# Patient Record
Sex: FEMALE | Race: WHITE | NOT HISPANIC OR LATINO | URBAN - METROPOLITAN AREA
[De-identification: names, ages, dates, MRNs, and addresses within clinical notes are randomized per-mention and may not be internally consistent; named-entity substitution may affect disease eponyms.]

---

## 2022-01-04 ENCOUNTER — INPATIENT (INPATIENT)
Facility: HOSPITAL | Age: 87
LOS: 1 days | Discharge: HOME | End: 2022-01-06
Attending: STUDENT IN AN ORGANIZED HEALTH CARE EDUCATION/TRAINING PROGRAM | Admitting: STUDENT IN AN ORGANIZED HEALTH CARE EDUCATION/TRAINING PROGRAM
Payer: MEDICARE

## 2022-01-04 VITALS
SYSTOLIC BLOOD PRESSURE: 116 MMHG | TEMPERATURE: 97 F | HEIGHT: 63 IN | HEART RATE: 80 BPM | OXYGEN SATURATION: 98 % | RESPIRATION RATE: 20 BRPM | DIASTOLIC BLOOD PRESSURE: 62 MMHG | WEIGHT: 130.07 LBS

## 2022-01-04 DIAGNOSIS — Z78.9 OTHER SPECIFIED HEALTH STATUS: Chronic | ICD-10-CM

## 2022-01-04 LAB
ALBUMIN SERPL ELPH-MCNC: 3.7 G/DL — SIGNIFICANT CHANGE UP (ref 3.5–5.2)
ALP SERPL-CCNC: 59 U/L — SIGNIFICANT CHANGE UP (ref 30–115)
ALT FLD-CCNC: 15 U/L — SIGNIFICANT CHANGE UP (ref 0–41)
ANION GAP SERPL CALC-SCNC: 13 MMOL/L — SIGNIFICANT CHANGE UP (ref 7–14)
APTT BLD: 29.4 SEC — SIGNIFICANT CHANGE UP (ref 27–39.2)
AST SERPL-CCNC: 36 U/L — SIGNIFICANT CHANGE UP (ref 0–41)
BASOPHILS # BLD AUTO: 0.04 K/UL — SIGNIFICANT CHANGE UP (ref 0–0.2)
BASOPHILS NFR BLD AUTO: 0.7 % — SIGNIFICANT CHANGE UP (ref 0–1)
BILIRUB SERPL-MCNC: 0.4 MG/DL — SIGNIFICANT CHANGE UP (ref 0.2–1.2)
BUN SERPL-MCNC: 21 MG/DL — HIGH (ref 10–20)
CALCIUM SERPL-MCNC: 9.4 MG/DL — SIGNIFICANT CHANGE UP (ref 8.5–10.1)
CHLORIDE SERPL-SCNC: 102 MMOL/L — SIGNIFICANT CHANGE UP (ref 98–110)
CO2 SERPL-SCNC: 21 MMOL/L — SIGNIFICANT CHANGE UP (ref 17–32)
CREAT SERPL-MCNC: 0.8 MG/DL — SIGNIFICANT CHANGE UP (ref 0.7–1.5)
EOSINOPHIL # BLD AUTO: 0.18 K/UL — SIGNIFICANT CHANGE UP (ref 0–0.7)
EOSINOPHIL NFR BLD AUTO: 3.3 % — SIGNIFICANT CHANGE UP (ref 0–8)
GLUCOSE SERPL-MCNC: 122 MG/DL — HIGH (ref 70–99)
HCT VFR BLD CALC: 42.1 % — SIGNIFICANT CHANGE UP (ref 37–47)
HGB BLD-MCNC: 13.8 G/DL — SIGNIFICANT CHANGE UP (ref 12–16)
IMM GRANULOCYTES NFR BLD AUTO: 0.2 % — SIGNIFICANT CHANGE UP (ref 0.1–0.3)
INR BLD: 1.15 RATIO — SIGNIFICANT CHANGE UP (ref 0.65–1.3)
LYMPHOCYTES # BLD AUTO: 2.58 K/UL — SIGNIFICANT CHANGE UP (ref 1.2–3.4)
LYMPHOCYTES # BLD AUTO: 47.1 % — SIGNIFICANT CHANGE UP (ref 20.5–51.1)
MAGNESIUM SERPL-MCNC: 2.2 MG/DL — SIGNIFICANT CHANGE UP (ref 1.8–2.4)
MCHC RBC-ENTMCNC: 29.2 PG — SIGNIFICANT CHANGE UP (ref 27–31)
MCHC RBC-ENTMCNC: 32.8 G/DL — SIGNIFICANT CHANGE UP (ref 32–37)
MCV RBC AUTO: 89.2 FL — SIGNIFICANT CHANGE UP (ref 81–99)
MONOCYTES # BLD AUTO: 0.41 K/UL — SIGNIFICANT CHANGE UP (ref 0.1–0.6)
MONOCYTES NFR BLD AUTO: 7.5 % — SIGNIFICANT CHANGE UP (ref 1.7–9.3)
NEUTROPHILS # BLD AUTO: 2.26 K/UL — SIGNIFICANT CHANGE UP (ref 1.4–6.5)
NEUTROPHILS NFR BLD AUTO: 41.2 % — LOW (ref 42.2–75.2)
NRBC # BLD: 0 /100 WBCS — SIGNIFICANT CHANGE UP (ref 0–0)
PLATELET # BLD AUTO: 213 K/UL — SIGNIFICANT CHANGE UP (ref 130–400)
POTASSIUM SERPL-MCNC: 5.7 MMOL/L — HIGH (ref 3.5–5)
POTASSIUM SERPL-SCNC: 5.7 MMOL/L — HIGH (ref 3.5–5)
PROT SERPL-MCNC: 6.4 G/DL — SIGNIFICANT CHANGE UP (ref 6–8)
PROTHROM AB SERPL-ACNC: 13.2 SEC — HIGH (ref 9.95–12.87)
RBC # BLD: 4.72 M/UL — SIGNIFICANT CHANGE UP (ref 4.2–5.4)
RBC # FLD: 13.1 % — SIGNIFICANT CHANGE UP (ref 11.5–14.5)
SARS-COV-2 RNA SPEC QL NAA+PROBE: SIGNIFICANT CHANGE UP
SODIUM SERPL-SCNC: 136 MMOL/L — SIGNIFICANT CHANGE UP (ref 135–146)
TROPONIN T SERPL-MCNC: <0.01 NG/ML — SIGNIFICANT CHANGE UP
WBC # BLD: 5.48 K/UL — SIGNIFICANT CHANGE UP (ref 4.8–10.8)
WBC # FLD AUTO: 5.48 K/UL — SIGNIFICANT CHANGE UP (ref 4.8–10.8)

## 2022-01-04 PROCEDURE — 99285 EMERGENCY DEPT VISIT HI MDM: CPT

## 2022-01-04 PROCEDURE — 99222 1ST HOSP IP/OBS MODERATE 55: CPT

## 2022-01-04 PROCEDURE — 71045 X-RAY EXAM CHEST 1 VIEW: CPT | Mod: 26

## 2022-01-04 PROCEDURE — 93880 EXTRACRANIAL BILAT STUDY: CPT | Mod: 26

## 2022-01-04 PROCEDURE — 93010 ELECTROCARDIOGRAM REPORT: CPT | Mod: 76

## 2022-01-04 RX ORDER — METOPROLOL TARTRATE 50 MG
50 TABLET ORAL DAILY
Refills: 0 | Status: DISCONTINUED | OUTPATIENT
Start: 2022-01-04 | End: 2022-01-06

## 2022-01-04 RX ORDER — SODIUM CHLORIDE 9 MG/ML
1000 INJECTION INTRAMUSCULAR; INTRAVENOUS; SUBCUTANEOUS
Refills: 0 | Status: DISCONTINUED | OUTPATIENT
Start: 2022-01-04 | End: 2022-01-06

## 2022-01-04 RX ORDER — ACETAMINOPHEN 500 MG
650 TABLET ORAL EVERY 6 HOURS
Refills: 0 | Status: DISCONTINUED | OUTPATIENT
Start: 2022-01-04 | End: 2022-01-06

## 2022-01-04 RX ORDER — LANOLIN ALCOHOL/MO/W.PET/CERES
5 CREAM (GRAM) TOPICAL AT BEDTIME
Refills: 0 | Status: DISCONTINUED | OUTPATIENT
Start: 2022-01-04 | End: 2022-01-06

## 2022-01-04 RX ORDER — APIXABAN 2.5 MG/1
5 TABLET, FILM COATED ORAL EVERY 12 HOURS
Refills: 0 | Status: DISCONTINUED | OUTPATIENT
Start: 2022-01-04 | End: 2022-01-04

## 2022-01-04 RX ORDER — APIXABAN 2.5 MG/1
2.5 TABLET, FILM COATED ORAL EVERY 12 HOURS
Refills: 0 | Status: DISCONTINUED | OUTPATIENT
Start: 2022-01-04 | End: 2022-01-06

## 2022-01-04 RX ORDER — ONDANSETRON 8 MG/1
4 TABLET, FILM COATED ORAL EVERY 8 HOURS
Refills: 0 | Status: DISCONTINUED | OUTPATIENT
Start: 2022-01-04 | End: 2022-01-06

## 2022-01-04 RX ORDER — ATORVASTATIN CALCIUM 80 MG/1
10 TABLET, FILM COATED ORAL AT BEDTIME
Refills: 0 | Status: DISCONTINUED | OUTPATIENT
Start: 2022-01-04 | End: 2022-01-06

## 2022-01-04 RX ADMIN — SODIUM CHLORIDE 75 MILLILITER(S): 9 INJECTION INTRAMUSCULAR; INTRAVENOUS; SUBCUTANEOUS at 09:43

## 2022-01-04 RX ADMIN — APIXABAN 2.5 MILLIGRAM(S): 2.5 TABLET, FILM COATED ORAL at 17:50

## 2022-01-04 RX ADMIN — ATORVASTATIN CALCIUM 10 MILLIGRAM(S): 80 TABLET, FILM COATED ORAL at 22:58
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
## 2022-01-04 NOTE — H&P ADULT - NSHPLABSRESULTS_GEN_ALL_CORE
.                        13.8   5.48  )-----------( 213      ( 04 Jan 2022 05:20 )             42.1       01-04    136  |  102  |  21<H>  ----------------------------<  122<H>  5.7<H>   |  21  |  0.8    Ca    9.4      04 Jan 2022 05:20  Mg     2.2     01-04    TPro  6.4  /  Alb  3.7  /  TBili  0.4  /  DBili  x   /  AST  36  /  ALT  15  /  AlkPhos  59  01-04            PT/INR - ( 04 Jan 2022 05:20 )   PT: 13.20 sec;   INR: 1.15 ratio      PTT - ( 04 Jan 2022 05:20 )  PTT:29.4 sec        Lactate Trend      CARDIAC MARKERS ( 04 Jan 2022 05:20 )  x     / <0.01 ng/mL / x     / x     / x              < from: Xray Chest 1 View AP/PA (01.04.22 @ 05:49) >    ACC: [id number] EXAM:  XR CHEST 1 VIEW                          PROCEDURE DATE:  01/04/2022          INTERPRETATION:  Clinical History / Reason for exam: Syncope.    Comparison : Chest radiograph June 24th, 2009.    Technique/Positioning: Frontal portable.    Findings:    Support devices: Telemetry leads overlie the thorax    Cardiac/mediastinum/hilum: The aorta is atherosclerotic.    Lung parenchyma/Pleura: Within normal limits.    Skeleton/soft tissues: Unremarkable.      Impression:    No radiographic evidence of acute cardiopulmonary disease.

## 2022-01-04 NOTE — H&P ADULT - ATTENDING COMMENTS
85 yo F with a pmhx of afib on eliquis underwent a witnessed syncopal episode with loc and did not hit her head.      - telemetry  - troponin neg x 1  - repeat troponins with ECG q 6hrs x 2  - orthostatics  - carotid duplex  - cardio consult  - TTE  - consider MRI/MRV

## 2022-01-04 NOTE — ED ADULT TRIAGE NOTE - CHIEF COMPLAINT QUOTE
BIBA. As per EMS patient was walking to the bathroom and passed out. Patient's daughter found patient on the floor. C/O weakness.

## 2022-01-04 NOTE — PATIENT PROFILE ADULT - FALL HARM RISK - HARM RISK INTERVENTIONS

## 2022-01-04 NOTE — H&P ADULT - NSICDXPASTMEDICALHX_GEN_ALL_CORE_FT
PAST MEDICAL HISTORY:  Chronic atrial fibrillation     HTN (hypertension)     Hyperlipemia     Overactive bladder

## 2022-01-04 NOTE — PATIENT PROFILE ADULT - LEGAL HELP
P/w: generalized weakness. Sepsis present on admission- no obvious source of infection at present;  f/u COVID-19 PCR and blood culture results2. BROOKS on CKD stage III- likely prerenal. Hypernatremia- likely due to dehydration. DM2: insulin protocol. Cognitive impairment likely dementia. DNR/DNI. Palliative consult.
no

## 2022-01-04 NOTE — H&P ADULT - SKIN
89y Female PMHx dementia, HTN, CVA (on aspirin, Plavix), HLD, CAD, [facility] pain and inability to ambulate.  S/p Surgical repair of hip fracture (4/24) c/b CVA post-op, failed S&S. Palliative Care consulted for complex decision making in the setting of dementia and new found CVA. No lesions; no rash
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
## 2022-01-05 LAB
CK MB CFR SERPL CALC: <1 NG/ML — SIGNIFICANT CHANGE UP (ref 0.6–6.3)
TROPONIN T SERPL-MCNC: <0.01 NG/ML — SIGNIFICANT CHANGE UP

## 2022-01-05 PROCEDURE — 99221 1ST HOSP IP/OBS SF/LOW 40: CPT

## 2022-01-05 PROCEDURE — 93306 TTE W/DOPPLER COMPLETE: CPT | Mod: 26

## 2022-01-05 PROCEDURE — 99233 SBSQ HOSP IP/OBS HIGH 50: CPT

## 2022-01-05 RX ORDER — OXYBUTYNIN CHLORIDE 5 MG
5 TABLET ORAL
Refills: 0 | Status: DISCONTINUED | OUTPATIENT
Start: 2022-01-05 | End: 2022-01-06

## 2022-01-05 RX ADMIN — Medication 5 MILLIGRAM(S): at 17:54

## 2022-01-05 RX ADMIN — APIXABAN 2.5 MILLIGRAM(S): 2.5 TABLET, FILM COATED ORAL at 17:55

## 2022-01-05 RX ADMIN — ATORVASTATIN CALCIUM 10 MILLIGRAM(S): 80 TABLET, FILM COATED ORAL at 21:54

## 2022-01-05 RX ADMIN — APIXABAN 2.5 MILLIGRAM(S): 2.5 TABLET, FILM COATED ORAL at 05:50

## 2022-01-05 NOTE — CONSULT NOTE ADULT - ASSESSMENT
86 year old woman with history of hld, paroxysmal afib on Eliquis 2.5 mg BID presented with episode of LOC. Exam is normal.  The presence of prodromal symptoms of dizziness is more suggestive of syncopal event rather than seizure. The episode of transient vision loss in both eyes and brain fog could also be indicative of paroxysmal event and presyncopal event.   Carotid Doppler showed only 20-30 percent stenosis.     - Recommend to increase Eliquis dose to 5 mg BID since patient's weight> 60 and GFR is normal   - Routine EEG   - Could obtain Brain MRI w/o contrast to r/o acute infarct  - Cardiology work up for syncope and paroxysmal AFIB
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
## 2022-01-06 ENCOUNTER — TRANSCRIPTION ENCOUNTER (OUTPATIENT)
Age: 87
End: 2022-01-06

## 2022-01-06 VITALS — HEART RATE: 64 BPM | DIASTOLIC BLOOD PRESSURE: 72 MMHG | SYSTOLIC BLOOD PRESSURE: 156 MMHG | TEMPERATURE: 98 F

## 2022-01-06 PROCEDURE — 99231 SBSQ HOSP IP/OBS SF/LOW 25: CPT

## 2022-01-06 PROCEDURE — 99233 SBSQ HOSP IP/OBS HIGH 50: CPT

## 2022-01-06 PROCEDURE — 70551 MRI BRAIN STEM W/O DYE: CPT | Mod: 26

## 2022-01-06 RX ORDER — APIXABAN 2.5 MG/1
1 TABLET, FILM COATED ORAL
Qty: 0 | Refills: 0 | DISCHARGE

## 2022-01-06 RX ORDER — APIXABAN 2.5 MG/1
1 TABLET, FILM COATED ORAL
Qty: 60 | Refills: 0
Start: 2022-01-06

## 2022-01-06 RX ADMIN — Medication 50 MILLIGRAM(S): at 06:06

## 2022-01-06 RX ADMIN — APIXABAN 2.5 MILLIGRAM(S): 2.5 TABLET, FILM COATED ORAL at 06:06

## 2022-01-06 RX ADMIN — APIXABAN 2.5 MILLIGRAM(S): 2.5 TABLET, FILM COATED ORAL at 18:23

## 2022-01-06 RX ADMIN — SODIUM CHLORIDE 75 MILLILITER(S): 9 INJECTION INTRAMUSCULAR; INTRAVENOUS; SUBCUTANEOUS at 01:33

## 2022-01-06 RX ADMIN — Medication 5 MILLIGRAM(S): at 18:33

## 2022-01-06 RX ADMIN — Medication 5 MILLIGRAM(S): at 06:06

## 2022-01-06 NOTE — DISCHARGE NOTE PROVIDER - NSDCMRMEDTOKEN_GEN_ALL_CORE_FT
Eliquis 5 mg oral tablet: 1 tab(s) orally 2 times a day  fenofibrate 54 mg oral tablet: 1 tab(s) orally once a day  lovastatin 20 mg oral tablet: 1 tab(s) orally once a day  metoprolol succinate 50 mg oral tablet, extended release: 1 tab(s) orally once a day  Myrbetriq 50 mg oral tablet, extended release: 1 tab(s) orally once a day (at bedtime)   apixaban 2.5 mg oral tablet: 1 tab(s) orally every 12 hours  fenofibrate 54 mg oral tablet: 1 tab(s) orally once a day  lovastatin 20 mg oral tablet: 1 tab(s) orally once a day  metoprolol succinate 50 mg oral tablet, extended release: 1 tab(s) orally once a day  Myrbetriq 50 mg oral tablet, extended release: 1 tab(s) orally once a day (at bedtime)

## 2022-01-06 NOTE — DISCHARGE NOTE NURSING/CASE MANAGEMENT/SOCIAL WORK - PATIENT PORTAL LINK FT
You can access the FollowMyHealth Patient Portal offered by [facility] by registering at the following website: [email or web address]. By joining [employer]’s FollowMyHealth portal, you will also be able to view your health information using other applications (apps) compatible with our system.

## 2022-01-06 NOTE — DISCHARGE NOTE PROVIDER - CARE PROVIDER_API CALL
Song Staton  [address] [place] [address]  Phone: [phone]  Fax: [phone]  Follow Up Time: 1 week
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
## 2022-01-06 NOTE — DISCHARGE NOTE NURSING/CASE MANAGEMENT/SOCIAL WORK - NSDCPEFALRISK_GEN_ALL_CORE
For information on Fall & Injury Prevention, visit: [email or web address] OR  [email or web address] OR  https://www.cdc.gov/steadi/patient.html

## 2022-01-06 NOTE — DISCHARGE NOTE PROVIDER - NSDCCPCAREPLAN_GEN_ALL_CORE_FT
PRINCIPAL DISCHARGE DIAGNOSIS  Diagnosis: Syncope  Assessment and Plan of Treatment: Follow up with your primary care doctor.      SECONDARY DISCHARGE DIAGNOSES  Diagnosis: Orthostatic hypotension  Assessment and Plan of Treatment: Arise slowly from recumbent position.     PRINCIPAL DISCHARGE DIAGNOSIS  Diagnosis: Syncope  Assessment and Plan of Treatment: Follow up with your primary care doctor. Return to hospital if you pass out.      SECONDARY DISCHARGE DIAGNOSES  Diagnosis: Orthostatic hypotension  Assessment and Plan of Treatment: Arise slowly from recumbent position.    Diagnosis: Atrial fibrillation  Assessment and Plan of Treatment: Eliquis dose adjusted for your age     PRINCIPAL DISCHARGE DIAGNOSIS  Diagnosis: Syncope  Assessment and Plan of Treatment: Your eeg was normal. Your MRI showed no evidence of stroke or mass, Follow up with your primary care doctor. Return to hospital if you pass out.      SECONDARY DISCHARGE DIAGNOSES  Diagnosis: Orthostatic hypotension  Assessment and Plan of Treatment: Arise slowly from recumbent position.    Diagnosis: Atrial fibrillation  Assessment and Plan of Treatment: Eliquis dose adjusted for your age

## 2022-01-06 NOTE — DISCHARGE NOTE PROVIDER - CARE PROVIDERS DIRECT ADDRESSES
[email or web address]

## 2022-01-06 NOTE — PROGRESS NOTE ADULT - SUBJECTIVE AND OBJECTIVE BOX
Patient is a 86y old  Female who presents with a chief complaint of near  Syncope, pt sat up , felt dizzy but got up and was helped by her daughter ( pt denies LOC), felt lightheaded.   Today pt feels OK and denies any complaints, asking about discharge.       Vital Signs Last 24 Hrs  T(C): 35.9 (05 Jan 2022 05:00), Max: 36.9 (04 Jan 2022 14:47)  T(F): 96.7 (05 Jan 2022 05:00), Max: 98.5 (04 Jan 2022 14:47)  HR: 62 (05 Jan 2022 05:00) (62 - 70)  BP: 105/54 (05 Jan 2022 05:00) (105/54 - 134/56)  BP(mean): --  RR: 16 (05 Jan 2022 05:00) (16 - 18)  SpO2: 96% (05 Jan 2022 07:27) (96% - 98%)    Orthostatic VS    01-04-22 @ 12:06  Lying BP: 138/58 HR: --   Sitting BP: 127/60 HR: --  Standing BP: 137/62 HR: --  Site: upper left arm   Mode: electronic    01-04-22 @ 07:49  Lying BP: 123/77 HR: 80   Sitting BP: 113/64 HR: 74  Standing BP: 110/56 HR: 89  Site: upper left arm   Mode: electronic    PHYSICAL EXAM:  GENERAL: NAD, well-groomed, well-developed  HEAD:  Atraumatic, Normocephalic  EYES: EOMI, PERRLA, conjunctiva and sclera clear  ENMT: No tonsillar erythema, exudates, or enlargement; Moist mucous membranes, Good dentition, No lesions  NECK: Supple, No JVD, Normal thyroid  NERVOUS SYSTEM:  Alert & Oriented X3, Good concentration; Motor Strength 5/5 B/L upper and lower extremities; DTRs 2+ intact and symmetric  CHEST/LUNG: Clear to percussion bilaterally; No rales, rhonchi, wheezing, or rubs  HEART: Regular rate and rhythm; No murmurs, rubs, or gallops  ABDOMEN: Soft, Nontender, Nondistended; Bowel sounds present  EXTREMITIES:  2+ Peripheral Pulses, No clubbing, cyanosis, or edema  LYMPH: No lymphadenopathy noted  SKIN: No rashes or lesions      LABS:                        13.8   5.48  )-----------( 213      ( 04 Jan 2022 05:20 )             42.1     01-04    136  |  102  |  21<H>  ----------------------------<  122<H>  5.7<H>   |  21  |  0.8    Ca    9.4      04 Jan 2022 05:20  Mg     2.2     01-04    TPro  6.4  /  Alb  3.7  /  TBili  0.4  /  DBili  x   /  AST  36  /  ALT  15  /  AlkPhos  59  01-04    PT/INR - ( 04 Jan 2022 05:20 )   PT: 13.20 sec;   INR: 1.15 ratio         PTT - ( 04 Jan 2022 05:20 )  PTT:29.4 sec    RADIOLOGY & ADDITIONAL TESTS:    < from: VA Duplex Carotid, Bilat (01.04.22 @ 10:44) >  IMPRESSION: Mild 20-39% internal carotid artery stenosis bilaterally    Measurement of carotid stenosis is based on velocity parameters that   correlate the residual internal carotid diameter with that of the more   distal vessel in accordance with amethod such as the North American   Symptomatic Carotid Endarterectomy Trial (NASCET).    < end of copied text >  < from: Xray Chest 1 View AP/PA (01.04.22 @ 05:49) >    Impression:    No radiographic evidence of acute cardiopulmonary disease.    < end of copied text >  < from: TTE Echo Complete w/o Contrast w/ Doppler (01.05.22 @ 09:30) >  Summary:   1. Left ventricular ejection fraction, by visual estimation, is 60 to   65%.   2. No evidence of mitral valve regurgitation.   3. Mild-moderate tricuspid regurgitation.   4. Sclerotic aortic valve with normal opening.   5. Estimated pulmonary artery systolic pressure is 43.8 mmHg assuming a   right atrial pressure of 3 mmHg, which is consistent with mild pulmonary   hypertension.   6. There is mild aortic root calcification.   7. Intra-atrial septal aneurysm.    < from: 12 Lead ECG (01.04.22 @ 10:03) >  Diagnosis Line Normal sinus rhythm  Normal ECG    < end of copied text >      MEDICATIONS  (STANDING):  apixaban 2.5 milliGRAM(s) Oral every 12 hours  atorvastatin 10 milliGRAM(s) Oral at bedtime  metoprolol succinate ER 50 milliGRAM(s) Oral daily  oxybutynin 5 milliGRAM(s) Oral two times a day  sodium chloride 0.9%. 1000 milliLiter(s) (75 mL/Hr) IV Continuous <Continuous>    MEDICATIONS  (PRN):  acetaminophen     Tablet .. 650 milliGRAM(s) Oral every 6 hours PRN Temp greater or equal to 38C (100.4F), Mild Pain (1 - 3)  aluminum hydroxide/magnesium hydroxide/simethicone Suspension 30 milliLiter(s) Oral every 4 hours PRN Dyspepsia  melatonin 5 milliGRAM(s) Oral at bedtime PRN Insomnia  ondansetron Injectable 4 milliGRAM(s) IV Push every 8 hours PRN Nausea and/or Vomiting      
Patient is a 86y old  Female who presents with a chief complaint of near  Syncope, pt sat up , felt dizzy but got up and was helped by her daughter ( pt denies LOC), felt lightheaded. Orthostatic VS were positive in one occasion, neurology recommended brain MRI to r/o CVA ( scheduled for today).   Today pt feels good, her blood pressure reading was high this morning, pt denies any specific complaints.       Vital Signs Last 24 Hrs  T(C): 36.1 (06 Jan 2022 05:30), Max: 36.4 (05 Jan 2022 13:57)  T(F): 96.9 (06 Jan 2022 05:30), Max: 97.6 (05 Jan 2022 13:57)  HR: 60 (06 Jan 2022 09:00) (60 - 72)  BP: 128/60 (06 Jan 2022 09:00) (117/55 - 158/68)  BP(mean): --  RR: 18 (06 Jan 2022 09:00) (16 - 18)  SpO2: 99% (06 Jan 2022 09:00) (99% - 99%)    Orthostatic VS    01-04-22 @ 12:06  Lying BP: 138/58 HR: --   Sitting BP: 127/60 HR: --  Standing BP: 137/62 HR: --  Site: upper left arm   Mode: electronic    01-04-22 @ 07:49  Lying BP: 123/77 HR: 80   Sitting BP: 113/64 HR: 74  Standing BP: 110/56 HR: 89  Site: upper left arm   Mode: electronic    PHYSICAL EXAM:  GENERAL: NAD, well-groomed, well-developed  HEAD:  Atraumatic, Normocephalic  EYES: EOMI, PERRLA, conjunctiva and sclera clear  ENMT: No tonsillar erythema, exudates, or enlargement; Moist mucous membranes, Good dentition, No lesions  NECK: Supple, No JVD, Normal thyroid  NERVOUS SYSTEM:  Alert & Oriented X3, Good concentration; Motor Strength 5/5 B/L upper and lower extremities; DTRs 2+ intact and symmetric  CHEST/LUNG: Clear to percussion bilaterally; No rales, rhonchi, wheezing, or rubs  HEART: Regular rate and rhythm; No murmurs, rubs, or gallops  ABDOMEN: Soft, Nontender, Nondistended; Bowel sounds present  EXTREMITIES:  2+ Peripheral Pulses, No clubbing, cyanosis, or edema  LYMPH: No lymphadenopathy noted  SKIN: No rashes or lesions      LABS:             no new labs today     RADIOLOGY & ADDITIONAL TESTS:    < from: VA Duplex Carotid, Bilat (01.04.22 @ 10:44) >  IMPRESSION: Mild 20-39% internal carotid artery stenosis bilaterally    Measurement of carotid stenosis is based on velocity parameters that   correlate the residual internal carotid diameter with that of the more   distal vessel in accordance with amethod such as the North American   Symptomatic Carotid Endarterectomy Trial (NASCET).    < end of copied text >  < from: Xray Chest 1 View AP/PA (01.04.22 @ 05:49) >    Impression:    No radiographic evidence of acute cardiopulmonary disease.    < end of copied text >  < from: TTE Echo Complete w/o Contrast w/ Doppler (01.05.22 @ 09:30) >  Summary:   1. Left ventricular ejection fraction, by visual estimation, is 60 to   65%.   2. No evidence of mitral valve regurgitation.   3. Mild-moderate tricuspid regurgitation.   4. Sclerotic aortic valve with normal opening.   5. Estimated pulmonary artery systolic pressure is 43.8 mmHg assuming a   right atrial pressure of 3 mmHg, which is consistent with mild pulmonary   hypertension.   6. There is mild aortic root calcification.   7. Intra-atrial septal aneurysm.    < from: 12 Lead ECG (01.04.22 @ 10:03) >  Diagnosis Line Normal sinus rhythm  Normal ECG    < from: EEG Awake or Drowsy (01.05.22 @ 14:30) >  Impression:  Normal  -    < end of copied text      MEDICATIONS  (STANDING):  apixaban 2.5 milliGRAM(s) Oral every 12 hours  atorvastatin 10 milliGRAM(s) Oral at bedtime  metoprolol succinate ER 50 milliGRAM(s) Oral daily  oxybutynin 5 milliGRAM(s) Oral two times a day  sodium chloride 0.9%. 1000 milliLiter(s) (75 mL/Hr) IV Continuous <Continuous>    MEDICATIONS  (PRN):  acetaminophen     Tablet .. 650 milliGRAM(s) Oral every 6 hours PRN Temp greater or equal to 38C (100.4F), Mild Pain (1 - 3)  aluminum hydroxide/magnesium hydroxide/simethicone Suspension 30 milliLiter(s) Oral every 4 hours PRN Dyspepsia  melatonin 5 milliGRAM(s) Oral at bedtime PRN Insomnia  ondansetron Injectable 4 milliGRAM(s) IV Push every 8 hours PRN Nausea and/or Vomiting        
Brain MRI showed no evidence of acute event.  Routine EEG is normal and there is no significant stenosis on the carotids.   No further work up from neurology standpoint

## 2022-01-06 NOTE — CHART NOTE - NSCHARTNOTEFT_GEN_A_CORE
Attempted but unable to complete discharge paper work on this patient Attempted but unable to complete discharge paper work on this patient until a few minutes ago (now done)

## 2022-01-06 NOTE — DISCHARGE NOTE PROVIDER - HOSPITAL COURSE
86yF  pmhx htn hld,  P. afib on Eliquis  pw  syncopal episode witnessed by daughter.  Pt patient and  daughter - pt  felt  fine all day - tonight  was walking to the bathroom,  felt lightheaded, called out to her daughter  who found her  weak still standing -  daughter  caught her  and dragged her to the bed -  per daughter  pt had LOC  and was not breathing -  while in the bed  pt remained unresponsive for  about 10 seconds but pt denies LOC.  Patient admitted to [address] [place] [address] telemetry unit for Near Syncope/ orthostatic hypotension. Patient placed on telemetry monitoring for 24 hours ( no significant events)   - maintain fluid balance ( orthostatic VS positive o  - repeat orthostatic VS   - d/c IV fluids   - 2Decho is significant for MR and PHTN   - fall precautions   - EKG : Sinus rhythm   - pt was consulted by neurology, brain MRI w/o contrast and REEG recommended     # Paroxysmal A-fib  - c/w Apixaban and BB     # Hyperkalemia   - f/u repeat potassium level   - low potassium diet     # DL  - c/w statin    86yF  pmhx htn hld,  P. afib on Eliquis  pw  syncopal episode witnessed by daughter.  Pt patient and  daughter - pt  felt  fine all day - tonight  was walking to the bathroom,  felt lightheaded, called out to her daughter  who found her  weak still standing -  daughter  caught her  and dragged her to the bed -  per daughter  pt had LOC  and was not breathing -  while in the bed  pt remained unresponsive for  about 10 seconds but pt denies LOC.  Patient admitted to [address] [place] [address] telemetry unit for Near Syncope/ orthostatic hypotension. Patient placed on telemetry monitoring for 24 hours ( no significant events seen). Treated with IVF, ortho bp positive. 2Decho is significant for MR and PHTN and EKG showed Sinus rhythm.  Consulted by neurology, brain MRI w/o contrast showed-- REEG was normal. Patient continued on Apixaban and BB for history of Paroxysmal A-fib  Patient found  to have Hyperkalemia.  Repeat potassium level was --and given low potassium diet      86yF  pmhx htn hld,  P. afib on Eliquis  pw  syncopal episode witnessed by daughter.  Pt patient and  daughter - pt  felt  fine all day - tonight  was walking to the bathroom,  felt lightheaded, called out to her daughter  who found her  weak still standing -  daughter  caught her  and dragged her to the bed -  per daughter  pt had LOC  and was not breathing -  while in the bed  pt remained unresponsive for  about 10 seconds but pt denies LOC.  Patient admitted to [address] [place] [address] telemetry unit for Near Syncope/ orthostatic hypotension. Patient placed on telemetry monitoring for 24 hours ( no significant events seen). Treated with IVF, ortho bp positive. 2Decho is significant for MR and PHTN and EKG showed Sinus rhythm.  Consulted by neurology, brain MRI w/o contrast showed no acute abnormality. REEG was normal. Patient continued on Apixaban and BB for history of Paroxysmal A-fib  Patient found  to have Hyperkalemia.  Repeat potassium level was --and given low potassium diet      86yF  pmhx htn hld,  P. afib on Eliquis  pw  syncopal episode witnessed by daughter.  Pt patient and  daughter - pt  felt  fine all day - tonight  was walking to the bathroom,  felt lightheaded, called out to her daughter  who found her  weak still standing -  daughter  caught her  and dragged her to the bed -  per daughter  pt had LOC  and was not breathing -  while in the bed  pt remained unresponsive for  about 10 seconds but pt denies LOC.  Patient admitted to [address] [place] [address] telemetry unit for Near Syncope/ orthostatic hypotension. Patient placed on telemetry monitoring for 24 hours ( no significant events seen). Treated with IVF, ortho bp positive. 2Decho is significant for MR and PHTN and EKG showed Sinus rhythm.  Consulted by neurology, brain MRI w/o contrast showed no acute abnormality. REEG was normal. Patient continued on Apixaban and BB for history of Paroxysmal A-fib  Patient found  to have Hyperkalemia. Pt was given a low potassium diet.

## 2022-01-12 DIAGNOSIS — I27.20 PULMONARY HYPERTENSION, UNSPECIFIED: ICD-10-CM

## 2022-01-12 DIAGNOSIS — R55 SYNCOPE AND COLLAPSE: ICD-10-CM

## 2022-01-12 DIAGNOSIS — Z88.8 ALLERGY STATUS TO OTHER DRUGS, MEDICAMENTS AND BIOLOGICAL SUBSTANCES: ICD-10-CM

## 2022-01-12 DIAGNOSIS — I34.0 NONRHEUMATIC MITRAL (VALVE) INSUFFICIENCY: ICD-10-CM

## 2022-01-12 DIAGNOSIS — I50.32 CHRONIC DIASTOLIC (CONGESTIVE) HEART FAILURE: ICD-10-CM

## 2022-01-12 DIAGNOSIS — E87.5 HYPERKALEMIA: ICD-10-CM

## 2022-01-12 DIAGNOSIS — Z91.018 ALLERGY TO OTHER FOODS: ICD-10-CM

## 2022-01-12 DIAGNOSIS — I48.0 PAROXYSMAL ATRIAL FIBRILLATION: ICD-10-CM

## 2022-01-12 DIAGNOSIS — E78.5 HYPERLIPIDEMIA, UNSPECIFIED: ICD-10-CM

## 2022-01-12 DIAGNOSIS — I95.1 ORTHOSTATIC HYPOTENSION: ICD-10-CM

## 2022-01-12 DIAGNOSIS — I11.0 HYPERTENSIVE HEART DISEASE WITH HEART FAILURE: ICD-10-CM

## 2022-01-12 DIAGNOSIS — Z79.01 LONG TERM (CURRENT) USE OF ANTICOAGULANTS: ICD-10-CM

## 2022-07-14 PROBLEM — I48.20 CHRONIC ATRIAL FIBRILLATION, UNSPECIFIED: Chronic | Status: ACTIVE | Noted: 2022-01-04

## 2022-07-14 PROBLEM — E78.5 HYPERLIPIDEMIA, UNSPECIFIED: Chronic | Status: ACTIVE | Noted: 2022-01-04

## 2022-07-14 PROBLEM — N32.81 OVERACTIVE BLADDER: Chronic | Status: ACTIVE | Noted: 2022-01-04

## 2022-07-14 PROBLEM — I10 ESSENTIAL (PRIMARY) HYPERTENSION: Chronic | Status: ACTIVE | Noted: 2022-01-04

## 2022-08-10 ENCOUNTER — APPOINTMENT (OUTPATIENT)
Dept: CARDIOLOGY | Facility: CLINIC | Age: 87
End: 2022-08-10

## 2022-08-19 NOTE — PATIENT PROFILE ADULT - NSTRANSFERBELONGINGSRESP_GEN_A_NUR
Prior Authorization Approval    Authorization Effective Date: 8/19/2022  Authorization Expiration Date: 8/19/2023  Medication: PRADAXA ANTICOAGULANT 150 MG capsule - APPROVED  Insurance Company: [employer] [place] - Phone [phone] Fax [phone]   Which Pharmacy is filling the prescription (Not needed for infusion/clinic administered): [id number] [address]  AT [facility] [address]  Pharmacy Notified: Yes  Patient Notified: Yes (pharmacy will notify patient when ready)       yes

## 2022-10-06 ENCOUNTER — APPOINTMENT (OUTPATIENT)
Dept: CARDIOLOGY | Facility: CLINIC | Age: 87
End: 2022-10-06

## 2022-10-06 ENCOUNTER — RX RENEWAL (OUTPATIENT)
Age: 87
End: 2022-10-06

## 2022-10-06 VITALS — HEIGHT: 63 IN | BODY MASS INDEX: 23.57 KG/M2 | WEIGHT: 133 LBS | TEMPERATURE: 97.6 F

## 2022-10-06 VITALS — HEART RATE: 75 BPM | SYSTOLIC BLOOD PRESSURE: 122 MMHG | DIASTOLIC BLOOD PRESSURE: 80 MMHG

## 2022-10-06 DIAGNOSIS — Z82.49 FAMILY HISTORY OF ISCHEMIC HEART DISEASE AND OTHER DISEASES OF THE CIRCULATORY SYSTEM: ICD-10-CM

## 2022-10-06 DIAGNOSIS — Z87.898 PERSONAL HISTORY OF OTHER SPECIFIED CONDITIONS: ICD-10-CM

## 2022-10-06 DIAGNOSIS — Z87.891 PERSONAL HISTORY OF NICOTINE DEPENDENCE: ICD-10-CM

## 2022-10-06 PROCEDURE — 99204 OFFICE O/P NEW MOD 45 MIN: CPT

## 2022-10-06 PROCEDURE — 93000 ELECTROCARDIOGRAM COMPLETE: CPT

## 2022-10-06 NOTE — REASON FOR VISIT
[Other: ____] : [unfilled] [FreeTextEntry1] : Diagnostic Tests:\par ---------------------\par EKG: \par 10/06/22-AF. LAD. \par 11/23/16-NSR. Low voltage, precordial leads. \par ---------------------\par Echo:\par 01/05/22-TTE: EF 60-65%. Aortic sclerosis. IAS aneurysm. Mild-mod TR. PASP 43.8 mmHg. \par ---------------------\par US:\par 01/04/22-Carotid: 20-39% B/L ICA stenosis. \par 11/30/16-Carotid: 20-39% B/L ICA stenosis.

## 2022-10-06 NOTE — HISTORY OF PRESENT ILLNESS
[FreeTextEntry1] : Ms. Samaniego is an 85yo F with PMHx of HTN, HLD, AF, TIA (many years ago) who presents to establish care. Her PMD is Dr. Igor Bass and previously followed with Dr. Luis Espino. Patient was admitted to [facility] [place] in January 2022 for syncope work up. She has been feeling lightheaded at times along with SOB. She feels like she needs to take a deep breath to catch her breath. Lightheadedness has been occurring a few times a day, a couple days a week. She denies syncope but has had near syncope.

## 2022-10-06 NOTE — ASSESSMENT
[FreeTextEntry1] : Lightheadedness: Pt with pAF, currently in AF. She has borderline BP. Could represent conversion pauses or possible AF with RVR.\par -Check 30 day MCOT. \par -Decrease metoprolol succinate 50mg to 25mg PO daily. \par -If conversion pauses, will need to discuss PPM with EP. \par \par AF: Rate controlled. [id number]\par -Continue with eliquis 5mg PO BID.\par -Currently on borderline for dose adjustment with Age >80 and weight ~60 kg. \par -Discussed with patient risk of lowering dose with weight on border, given hx of TIA, could underdose and increase stroke risk.\par -No bleeding history. \par -Discussed with patient to monitor weight closely and check for any signs of bleeding.\par -If patient continues to lose weight, will consider decreasing dose to 2.5mg PO daily.\par -Lower Toprol as above. \par \par HTN: BP at goal per ACC/AHA 2018 guidelines\par -Continue with Toprol 25mg PO daily .\par \par HLD: Need labs\par -Continue with lovastatin 20mg PO and fenofibrate 54mg PO daily. \par -Check labs. \par \par Follow up in 6 weeks.

## 2022-10-06 NOTE — PHYSICAL EXAM
[Well Developed] : well developed [Well Nourished] : well nourished [No Acute Distress] : no acute distress [Normal Conjunctiva] : normal conjunctiva [Normal Venous Pressure] : normal venous pressure [5th Left ICS - MCL] : palpated at the 5th LICS in the midclavicular line [Normal] : normal [No Precordial Heave] : no precordial heave was noted [Normal Rate] : normal [Normal S1] : normal S1 [Normal S2] : normal S2 [No Murmur] : no murmurs heard [No Pitting Edema] : no pitting edema present [2+] : left 2+ [Clear Lung Fields] : clear lung fields [Good Air Entry] : good air entry [No Respiratory Distress] : no respiratory distress  [Soft] : abdomen soft [Non Tender] : non-tender [Normal Bowel Sounds] : normal bowel sounds [Normal Gait] : normal gait [No Edema] : no edema [No Varicosities] : no varicosities [No Rash] : no rash [Moves all extremities] : moves all extremities [No Focal Deficits] : no focal deficits [Alert and Oriented] : alert and oriented [Irregularly Irregular] : irregularly irregular

## 2022-10-06 NOTE — REVIEW OF SYSTEMS
[Negative] : Heme/Lymph [SOB] : shortness of breath [Dyspnea on exertion] : not dyspnea during exertion [Dizziness] : dizziness [de-identified] : lightheadedness

## 2022-10-10 ENCOUNTER — INPATIENT (INPATIENT)
Facility: HOSPITAL | Age: 87
LOS: 1 days | Discharge: HOME HEALTH | End: 2022-10-12
Attending: STUDENT IN AN ORGANIZED HEALTH CARE EDUCATION/TRAINING PROGRAM | Admitting: STUDENT IN AN ORGANIZED HEALTH CARE EDUCATION/TRAINING PROGRAM

## 2022-10-10 ENCOUNTER — NON-APPOINTMENT (OUTPATIENT)
Age: 87
End: 2022-10-10

## 2022-10-10 VITALS
TEMPERATURE: 99 F | SYSTOLIC BLOOD PRESSURE: 169 MMHG | HEART RATE: 81 BPM | RESPIRATION RATE: 18 BRPM | DIASTOLIC BLOOD PRESSURE: 73 MMHG | WEIGHT: 132.94 LBS | HEIGHT: 63 IN | OXYGEN SATURATION: 96 %

## 2022-10-10 DIAGNOSIS — Z78.9 OTHER SPECIFIED HEALTH STATUS: Chronic | ICD-10-CM

## 2022-10-10 LAB
ALBUMIN SERPL ELPH-MCNC: 4.2 G/DL — SIGNIFICANT CHANGE UP (ref 3.5–5.2)
ALP SERPL-CCNC: 53 U/L — SIGNIFICANT CHANGE UP (ref 30–115)
ALT FLD-CCNC: 12 U/L — SIGNIFICANT CHANGE UP (ref 0–41)
ANION GAP SERPL CALC-SCNC: 10 MMOL/L — SIGNIFICANT CHANGE UP (ref 7–14)
APPEARANCE UR: CLEAR — SIGNIFICANT CHANGE UP
AST SERPL-CCNC: 28 U/L — SIGNIFICANT CHANGE UP (ref 0–41)
BACTERIA # UR AUTO: NEGATIVE — SIGNIFICANT CHANGE UP
BASOPHILS # BLD AUTO: 0.03 K/UL — SIGNIFICANT CHANGE UP (ref 0–0.2)
BASOPHILS NFR BLD AUTO: 0.5 % — SIGNIFICANT CHANGE UP (ref 0–1)
BILIRUB SERPL-MCNC: 0.4 MG/DL — SIGNIFICANT CHANGE UP (ref 0.2–1.2)
BILIRUB UR-MCNC: NEGATIVE — SIGNIFICANT CHANGE UP
BUN SERPL-MCNC: 19 MG/DL — SIGNIFICANT CHANGE UP (ref 10–20)
CALCIUM SERPL-MCNC: 9.4 MG/DL — SIGNIFICANT CHANGE UP (ref 8.4–10.5)
CHLORIDE SERPL-SCNC: 104 MMOL/L — SIGNIFICANT CHANGE UP (ref 98–110)
CO2 SERPL-SCNC: 24 MMOL/L — SIGNIFICANT CHANGE UP (ref 17–32)
COLOR SPEC: COLORLESS — SIGNIFICANT CHANGE UP
CREAT SERPL-MCNC: 0.8 MG/DL — SIGNIFICANT CHANGE UP (ref 0.7–1.5)
DIFF PNL FLD: NEGATIVE — SIGNIFICANT CHANGE UP
EGFR: 72 ML/MIN/1.73M2 — SIGNIFICANT CHANGE UP
EOSINOPHIL # BLD AUTO: 0.09 K/UL — SIGNIFICANT CHANGE UP (ref 0–0.7)
EOSINOPHIL NFR BLD AUTO: 1.6 % — SIGNIFICANT CHANGE UP (ref 0–8)
EPI CELLS # UR: 1 /HPF — SIGNIFICANT CHANGE UP (ref 0–5)
GLUCOSE SERPL-MCNC: 89 MG/DL — SIGNIFICANT CHANGE UP (ref 70–99)
GLUCOSE UR QL: NEGATIVE — SIGNIFICANT CHANGE UP
HCT VFR BLD CALC: 37.4 % — SIGNIFICANT CHANGE UP (ref 37–47)
HGB BLD-MCNC: 12.9 G/DL — SIGNIFICANT CHANGE UP (ref 12–16)
HYALINE CASTS # UR AUTO: 0 /LPF — SIGNIFICANT CHANGE UP (ref 0–7)
IMM GRANULOCYTES NFR BLD AUTO: 0.4 % — HIGH (ref 0.1–0.3)
KETONES UR-MCNC: NEGATIVE — SIGNIFICANT CHANGE UP
LEUKOCYTE ESTERASE UR-ACNC: ABNORMAL
LYMPHOCYTES # BLD AUTO: 1.82 K/UL — SIGNIFICANT CHANGE UP (ref 1.2–3.4)
LYMPHOCYTES # BLD AUTO: 32.8 % — SIGNIFICANT CHANGE UP (ref 20.5–51.1)
MAGNESIUM SERPL-MCNC: 2 MG/DL — SIGNIFICANT CHANGE UP (ref 1.8–2.4)
MCHC RBC-ENTMCNC: 30.5 PG — SIGNIFICANT CHANGE UP (ref 27–31)
MCHC RBC-ENTMCNC: 34.5 G/DL — SIGNIFICANT CHANGE UP (ref 32–37)
MCV RBC AUTO: 88.4 FL — SIGNIFICANT CHANGE UP (ref 81–99)
MONOCYTES # BLD AUTO: 0.37 K/UL — SIGNIFICANT CHANGE UP (ref 0.1–0.6)
MONOCYTES NFR BLD AUTO: 6.7 % — SIGNIFICANT CHANGE UP (ref 1.7–9.3)
NEUTROPHILS # BLD AUTO: 3.22 K/UL — SIGNIFICANT CHANGE UP (ref 1.4–6.5)
NEUTROPHILS NFR BLD AUTO: 58 % — SIGNIFICANT CHANGE UP (ref 42.2–75.2)
NITRITE UR-MCNC: NEGATIVE — SIGNIFICANT CHANGE UP
NRBC # BLD: 0 /100 WBCS — SIGNIFICANT CHANGE UP (ref 0–0)
NT-PROBNP SERPL-SCNC: 351 PG/ML — HIGH (ref 0–300)
PH UR: 7 — SIGNIFICANT CHANGE UP (ref 5–8)
PLATELET # BLD AUTO: 205 K/UL — SIGNIFICANT CHANGE UP (ref 130–400)
POTASSIUM SERPL-MCNC: 5.4 MMOL/L — HIGH (ref 3.5–5)
POTASSIUM SERPL-SCNC: 5.4 MMOL/L — HIGH (ref 3.5–5)
PROT SERPL-MCNC: 6.5 G/DL — SIGNIFICANT CHANGE UP (ref 6–8)
PROT UR-MCNC: NEGATIVE — SIGNIFICANT CHANGE UP
RBC # BLD: 4.23 M/UL — SIGNIFICANT CHANGE UP (ref 4.2–5.4)
RBC # FLD: 13.7 % — SIGNIFICANT CHANGE UP (ref 11.5–14.5)
RBC CASTS # UR COMP ASSIST: 1 /HPF — SIGNIFICANT CHANGE UP (ref 0–4)
SARS-COV-2 RNA SPEC QL NAA+PROBE: SIGNIFICANT CHANGE UP
SODIUM SERPL-SCNC: 138 MMOL/L — SIGNIFICANT CHANGE UP (ref 135–146)
SP GR SPEC: 1 — LOW (ref 1.01–1.03)
TROPONIN T SERPL-MCNC: <0.01 NG/ML — SIGNIFICANT CHANGE UP
UROBILINOGEN FLD QL: SIGNIFICANT CHANGE UP
WBC # BLD: 5.55 K/UL — SIGNIFICANT CHANGE UP (ref 4.8–10.8)
WBC # FLD AUTO: 5.55 K/UL — SIGNIFICANT CHANGE UP (ref 4.8–10.8)
WBC UR QL: 2 /HPF — SIGNIFICANT CHANGE UP (ref 0–5)

## 2022-10-10 PROCEDURE — 99285 EMERGENCY DEPT VISIT HI MDM: CPT | Mod: GC

## 2022-10-10 PROCEDURE — 99222 1ST HOSP IP/OBS MODERATE 55: CPT

## 2022-10-10 PROCEDURE — 71045 X-RAY EXAM CHEST 1 VIEW: CPT | Mod: 26

## 2022-10-10 PROCEDURE — 99285 EMERGENCY DEPT VISIT HI MDM: CPT

## 2022-10-10 PROCEDURE — 93010 ELECTROCARDIOGRAM REPORT: CPT

## 2022-10-10 RX ORDER — METOPROLOL TARTRATE 50 MG
1 TABLET ORAL
Qty: 0 | Refills: 0 | DISCHARGE

## 2022-10-10 RX ORDER — ATORVASTATIN CALCIUM 80 MG/1
10 TABLET, FILM COATED ORAL AT BEDTIME
Refills: 0 | Status: DISCONTINUED | OUTPATIENT
Start: 2022-10-10 | End: 2022-10-12

## 2022-10-10 RX ORDER — METOPROLOL TARTRATE 50 MG
25 TABLET ORAL
Refills: 0 | Status: DISCONTINUED | OUTPATIENT
Start: 2022-10-10 | End: 2022-10-11

## 2022-10-10 RX ORDER — ASPIRIN/CALCIUM CARB/MAGNESIUM 324 MG
81 TABLET ORAL DAILY
Refills: 0 | Status: DISCONTINUED | OUTPATIENT
Start: 2022-10-10 | End: 2022-10-12

## 2022-10-10 RX ADMIN — Medication 25 MILLIGRAM(S): at 18:23

## 2022-10-10 RX ADMIN — ATORVASTATIN CALCIUM 10 MILLIGRAM(S): 80 TABLET, FILM COATED ORAL at 22:01

## 2022-10-10 NOTE — H&P ADULT - NSHPLABSRESULTS_GEN_ALL_CORE
12.9   5.55  )-----------( 205      ( 10 Oct 2022 12:59 )             37.4   10-10    138  |  104  |  19  ----------------------------<  89  5.4<H>   |  24  |  0.8    Ca    9.4      10 Oct 2022 12:59  Mg     2.0     10-10    TPro  6.5  /  Alb  4.2  /  TBili  0.4  /  DBili  x   /  AST  28  /  ALT  12  /  AlkPhos  53  10-10

## 2022-10-10 NOTE — H&P ADULT - HISTORY OF PRESENT ILLNESS
Patient is an 87 year old female with PMHx of overactive bladder, HLD, PAF, on Eliquis, who has been experiencing episodes of intermittent dizziness. Patient reports in January 2022 she had an episode where she lost conciousness and was taken to ED at [facility] and was discharged after a negative cardiac work up.    She recently established care with Dr Manny Bryant and reports an increase in episodes of dizziness over the past 6 months but denied any recurrent syncope. She reports episodes are sporadic. and not positional. She can not identify any aggravating or relieving factors. She is very active and has no difficulty with cardiovascular exercise. She denies any chest pain, sob, LE edema, palpitations. She denies any previous CAD/CHF/MI.    In ED 12 Lead EKG showed SR with NSST changes. Troponin was negative x1 and BNP was 351. She is being admitted to cardiac telemetry for further management    2D echo 1/5/2022  LVEF 60-65%, Mild-Mod TR

## 2022-10-10 NOTE — CONSULT NOTE ADULT - ASSESSMENT
Cardiology" Dr. Bryant    This is a 86 yo female with PMH HLD, HTN, AF (Eliquis), syncope who presents with presyncopal episode on Saturday, pt had 6 sec pause.     Impression:  Sick sinus syndrome with 6 sec pause on MCOT  Hx HTN, HLD, AF  Hx syncope    Plan:  - Recommend PPM, discussed with pt and daughter at bedside  - Plan tentatively for tomorrow  - NPO after midnight  - Hold Eliquis (last dose this AM)  - Hold lopressor until after PPM placed  - Echo  - Tele  - BMP, CBC, coags

## 2022-10-10 NOTE — PATIENT PROFILE ADULT - LIVING ENVIRONMENT
Lindsay given on medications x1, as patient has upcoming appointment with PCP on 5/3/18.    Holly Orantes RN     no

## 2022-10-10 NOTE — ED PROVIDER NOTE - CLINICAL SUMMARY MEDICAL DECISION MAKING FREE TEXT BOX
87-year-old female with past medical history hypertension, hyperlipidemia, atrial fibrillation on Eliquis who presents to the ER for admission.  She was noted to have 6-second pause on her MCOT and was advised to come in for pacemaker.  Patient well-appearing on my assessment, Gen - NAD, Head - NCAT, Pharynx - clear, MMM, Heart - RRR, no m/g/r, Lungs - CTAB, no w/c/r, Abdomen - soft, NT, ND, Skin - No rash, Extremities - FROM, no edema, erythema, ecchymosis, brisk cap refill, Neuro - A&O x3, equal strength and sensation, non-focal exam. Denies active chest pain or palpitations.  EKG, labs personally reviewed and patient admitted for further management.

## 2022-10-10 NOTE — H&P ADULT - ASSESSMENT
Patient is an 87 year old female with PMHx of HLD, PAF on Eliquis who presents with dizzy spells and pauses on event monitor as long as 6 seconds.    Impression    # SSS  Episodes of dizziness and syncope intermittently over the past 1 year  Pauses on MCOT up to 6 seconds  Plan for PPM in AM    # PAF  Tachy/Dave Syndrome  Currently SR  CHADS-VASC score 3  Hold Eliquis(Last Dose 10/10/22 am)    # HLD  On Lovastatin at home  Therapeutic interchange-Lipitor 10  Check Fasting Lipids    # Overactive bladder  On Mybetriq at home  Non formulary at [facility]

## 2022-10-10 NOTE — ED PROVIDER NOTE - OBJECTIVE STATEMENT
Patient is an 87 year old female with PMHx of overactive bladder, HLD, PAF, on Eliquis, who has been experiencing episodes of intermittent dizziness. Patient reports in January 2022 she had an episode where she lost conciousness and was taken to ED at [facility] and was discharged after a negative cardiac work up.    She recently established care with Dr Manny Bryant and reports an increase in episodes of dizziness over the past 6 months but denied any recurrent syncope. She reports episodes are sporadic. and not positional. She can not identify any aggravating or relieving factors. She is very active and has no difficulty with cardiovascular exercise. She denies any chest pain, sob, LE edema, palpitations. She denies any previous CAD/CHF/MI.

## 2022-10-10 NOTE — ED ADULT NURSE NOTE - OBJECTIVE STATEMENT
Pt a&ox3, was sent to the ED secondary to heartbeat skipping 6 seconds, pt on portable monitor and tracked by cardiologist, pt will need a pacemaker and will be admitted, no SOB, unlabored breathing, equal rise & fall, able to speak in full sentences, denies CP, no N/V/D. PMH of HTN, A-FIB, HLD and on medications.

## 2022-10-10 NOTE — PATIENT PROFILE ADULT - FALL HARM RISK - HARM RISK INTERVENTIONS

## 2022-10-10 NOTE — ED ADULT NURSE NOTE - CHIEF COMPLAINT QUOTE
as per pt, "My cardiologist told me to come to the hospital because he said my heart had a 6 second delay before firing again." Pt wears a halter monitor.

## 2022-10-10 NOTE — PATIENT PROFILE ADULT - DO YOU FEEL LIKE HURTING YOURSELF OR OTHERS?
Pt appeared awake for approximately one hour during the night. Pt slept for rest of night with no noted or reported concerns. Continues on 15 min checks.    no

## 2022-10-10 NOTE — CONSULT NOTE ADULT - SUBJECTIVE AND OBJECTIVE BOX
Patient is a 87y old  Female who presents with a chief complaint of called from cardiologist to be evaluated in ED for 6 sec pause on MCOT.    HPI:  This is a 88 yo female with PMH HLD, HTN, AF (Eliquis), syncope who presents with presyncopal episode on Saturday. Pt was called by her cardiologist, Dr. Bryant, who noted a 6 sec pause on her MCOT monitor and was advised to come to ED for further w/u. At time of episode on 10/8 approx 15:30 pt felt like she was going to pass out with associated visual changes. Denies syncope, headache, palpitationsm chest pain, SOB, abd, n/v/d/c.     EP consulted for PPm eval.     REVIEW OF SYSTEMS    [x] A ten-point review of systems was otherwise negative except as noted.  [ ] Due to altered mental status/intubation, subjective information were not able to be obtained from the patient. History was obtained, to the extent possible, from review of the chart and collateral sources of information.      PAST MEDICAL & SURGICAL HISTORY:  HTN (hypertension)  Chronic atrial fibrillation  Hyperlipemia  Overactive bladder  Surgical history unknown    Home Medications:  fenofibrate 54 mg oral tablet: 1 tab(s) orally once a day (04 Jan 2022 09:35)  lovastatin 20 mg oral tablet: 1 tab(s) orally once a day (04 Jan 2022 09:35)  metoprolol succinate 50 mg oral tablet, extended release: 1 tab(s) orally once a day (04 Jan 2022 09:35)  Myrbetriq 50 mg oral tablet, extended release: 1 tab(s) orally once a day (at bedtime) (04 Jan 2022 09:35)      Allergies:    penicillin (Hives)  strawberry (Hives)      MEDICATIONS  (STANDING):    MEDICATIONS  (PRN):      Vital Signs Last 24 Hrs  T(C): 37 (10 Oct 2022 11:51), Max: 37 (10 Oct 2022 11:51)  T(F): 98.6 (10 Oct 2022 11:51), Max: 98.6 (10 Oct 2022 11:51)  HR: 81 (10 Oct 2022 11:51) (81 - 81)  BP: 169/73 (10 Oct 2022 11:51) (169/73 - 169/73)  BP(mean): --  RR: 18 (10 Oct 2022 11:51) (18 - 18)  SpO2: 96% (10 Oct 2022 11:51) (96% - 96%)    Parameters below as of 10 Oct 2022 11:51  Patient On (Oxygen Delivery Method): room air        PHYSICAL EXAM:    GENERAL: Elderly female, In no apparent distress, well nourished, and hydrated.  HEART: Regular rate and rhythm; No murmurs, rubs, or gallops.  PULMONARY: Clear to auscultation and perfusion.  No rales, wheezing, or rhonchi bilaterally.  ABDOMEN: Soft, Nontender, Nondistended; Bowel sounds present  EXTREMITIES:  2+ Peripheral Pulses, No clubbing, cyanosis, or edema  NEUROLOGICAL: AO x4, INFANTE, speech clear    INTERPRETATION OF TELEMETRY: SR      I&O's Detail      LABS:                        12.9   5.55  )-----------( 205      ( 10 Oct 2022 12:59 )             37.4         RADIOLOGY:  < from: TTE Echo Complete w/o Contrast w/ Doppler (01.05.22 @ 09:30) >  Summary:   1. Left ventricular ejection fraction, by visual estimation, is 60 to   65%.   2. No evidence of mitral valve regurgitation.   3. Mild-moderate tricuspid regurgitation.   4. Sclerotic aortic valve with normal opening.   5. Estimated pulmonary artery systolic pressure is 43.8 mmHg assuming a   right atrial pressure of 3 mmHg, which is consistent with mild pulmonary   hypertension.   6. There is mild aortic root calcification.   7. Intra-atrial septal aneurysm.    < end of copied text >

## 2022-10-10 NOTE — H&P ADULT - NS ATTEND AMEND GEN_ALL_CORE FT
Agree with above.     Patient with tachy-erica syndrome and history of syncope. MCOT with up to 6 second pauses. Holding Lopressor and monitoring on telemetry for risk of sinus pauses. Plan for PPM tomorrow. Coordinated care with EP.

## 2022-10-11 ENCOUNTER — TRANSCRIPTION ENCOUNTER (OUTPATIENT)
Age: 87
End: 2022-10-11

## 2022-10-11 LAB
ANION GAP SERPL CALC-SCNC: 8 MMOL/L — SIGNIFICANT CHANGE UP (ref 7–14)
BUN SERPL-MCNC: 16 MG/DL — SIGNIFICANT CHANGE UP (ref 10–20)
CALCIUM SERPL-MCNC: 9.1 MG/DL — SIGNIFICANT CHANGE UP (ref 8.4–10.5)
CHLORIDE SERPL-SCNC: 107 MMOL/L — SIGNIFICANT CHANGE UP (ref 98–110)
CHOLEST SERPL-MCNC: 137 MG/DL — SIGNIFICANT CHANGE UP
CO2 SERPL-SCNC: 26 MMOL/L — SIGNIFICANT CHANGE UP (ref 17–32)
CREAT SERPL-MCNC: 0.7 MG/DL — SIGNIFICANT CHANGE UP (ref 0.7–1.5)
EGFR: 84 ML/MIN/1.73M2 — SIGNIFICANT CHANGE UP
GLUCOSE SERPL-MCNC: 84 MG/DL — SIGNIFICANT CHANGE UP (ref 70–99)
HCT VFR BLD CALC: 34.5 % — LOW (ref 37–47)
HDLC SERPL-MCNC: 45 MG/DL — LOW
HGB BLD-MCNC: 11.7 G/DL — LOW (ref 12–16)
LIPID PNL WITH DIRECT LDL SERPL: 78 MG/DL — SIGNIFICANT CHANGE UP
MAGNESIUM SERPL-MCNC: 1.9 MG/DL — SIGNIFICANT CHANGE UP (ref 1.8–2.4)
MCHC RBC-ENTMCNC: 30 PG — SIGNIFICANT CHANGE UP (ref 27–31)
MCHC RBC-ENTMCNC: 33.9 G/DL — SIGNIFICANT CHANGE UP (ref 32–37)
MCV RBC AUTO: 88.5 FL — SIGNIFICANT CHANGE UP (ref 81–99)
NON HDL CHOLESTEROL: 92 MG/DL — SIGNIFICANT CHANGE UP
NRBC # BLD: 0 /100 WBCS — SIGNIFICANT CHANGE UP (ref 0–0)
PLATELET # BLD AUTO: 171 K/UL — SIGNIFICANT CHANGE UP (ref 130–400)
POTASSIUM SERPL-MCNC: 4.5 MMOL/L — SIGNIFICANT CHANGE UP (ref 3.5–5)
POTASSIUM SERPL-SCNC: 4.5 MMOL/L — SIGNIFICANT CHANGE UP (ref 3.5–5)
RBC # BLD: 3.9 M/UL — LOW (ref 4.2–5.4)
RBC # FLD: 13.6 % — SIGNIFICANT CHANGE UP (ref 11.5–14.5)
SODIUM SERPL-SCNC: 141 MMOL/L — SIGNIFICANT CHANGE UP (ref 135–146)
TRIGL SERPL-MCNC: 68 MG/DL — SIGNIFICANT CHANGE UP
TSH SERPL-MCNC: 1.71 UIU/ML — SIGNIFICANT CHANGE UP (ref 0.27–4.2)
WBC # BLD: 4.46 K/UL — LOW (ref 4.8–10.8)
WBC # FLD AUTO: 4.46 K/UL — LOW (ref 4.8–10.8)

## 2022-10-11 PROCEDURE — 71045 X-RAY EXAM CHEST 1 VIEW: CPT | Mod: 26

## 2022-10-11 PROCEDURE — 93306 TTE W/DOPPLER COMPLETE: CPT | Mod: 26

## 2022-10-11 PROCEDURE — 33208 INSRT HEART PM ATRIAL & VENT: CPT | Mod: KX

## 2022-10-11 PROCEDURE — 99232 SBSQ HOSP IP/OBS MODERATE 35: CPT

## 2022-10-11 RX ORDER — ACETAMINOPHEN 500 MG
650 TABLET ORAL ONCE
Refills: 0 | Status: COMPLETED | OUTPATIENT
Start: 2022-10-11 | End: 2022-10-11

## 2022-10-11 RX ORDER — METOPROLOL TARTRATE 50 MG
25 TABLET ORAL
Refills: 0 | Status: DISCONTINUED | OUTPATIENT
Start: 2022-10-11 | End: 2022-10-12

## 2022-10-11 RX ORDER — VANCOMYCIN HCL 1 G
750 VIAL (EA) INTRAVENOUS ONCE
Refills: 0 | Status: COMPLETED | OUTPATIENT
Start: 2022-10-12 | End: 2022-10-12

## 2022-10-11 RX ORDER — VANCOMYCIN HCL 1 G
1000 VIAL (EA) INTRAVENOUS ONCE
Refills: 0 | Status: COMPLETED | OUTPATIENT
Start: 2022-10-11 | End: 2022-10-11

## 2022-10-11 RX ORDER — METOPROLOL TARTRATE 50 MG
0.5 TABLET ORAL
Qty: 0 | Refills: 0 | DISCHARGE

## 2022-10-11 RX ORDER — METOPROLOL TARTRATE 50 MG
1 TABLET ORAL
Qty: 0 | Refills: 0 | DISCHARGE
Start: 2022-10-11

## 2022-10-11 RX ADMIN — Medication 25 MILLIGRAM(S): at 06:13

## 2022-10-11 RX ADMIN — Medication 81 MILLIGRAM(S): at 12:36

## 2022-10-11 RX ADMIN — Medication 250 MILLIGRAM(S): at 15:48

## 2022-10-11 RX ADMIN — ATORVASTATIN CALCIUM 10 MILLIGRAM(S): 80 TABLET, FILM COATED ORAL at 21:50

## 2022-10-11 RX ADMIN — Medication 250 MILLIGRAM(S): at 14:57

## 2022-10-11 RX ADMIN — Medication 25 MILLIGRAM(S): at 18:55

## 2022-10-11 RX ADMIN — Medication 650 MILLIGRAM(S): at 22:21

## 2022-10-11 RX ADMIN — Medication 650 MILLIGRAM(S): at 23:15

## 2022-10-11 NOTE — DISCHARGE NOTE PROVIDER - NSDCMRMEDTOKEN_GEN_ALL_CORE_FT
apixaban 5 mg oral tablet: 1 tab(s) orally 2 times a day  Ecotrin Adult Low Strength 81 mg oral delayed release tablet: 1 tab(s) orally once a day  fenofibrate 54 mg oral tablet: 1 tab(s) orally once a day  Lopressor 50 mg oral tablet: 0.5 tab(s) orally 2 times a day  lovastatin 20 mg oral tablet: 1 tab(s) orally once a day  Myrbetriq 50 mg oral tablet, extended release: 1 tab(s) orally once a day (at bedtime)   apixaban 5 mg oral tablet: 1 tab(s) orally 2 times a day  Ecotrin Adult Low Strength 81 mg oral delayed release tablet: 1 tab(s) orally once a day  fenofibrate 54 mg oral tablet: 1 tab(s) orally once a day  lovastatin 20 mg oral tablet: 1 tab(s) orally once a day  metoprolol tartrate 25 mg oral tablet: 1 tab(s) orally 2 times a day  Myrbetriq 50 mg oral tablet, extended release: 1 tab(s) orally once a day (at bedtime)   apixaban 2.5 mg oral tablet: 1 tab(s) orally 2 times a day. PLEASE RESUME ON 10/14 IN THE AM.   Ecotrin Adult Low Strength 81 mg oral delayed release tablet: 1 tab(s) orally once a day  fenofibrate 54 mg oral tablet: 1 tab(s) orally once a day  lovastatin 20 mg oral tablet: 1 tab(s) orally once a day  Metoprolol Tartrate 25 mg oral tablet: 0.5 tab(s) orally 2 times a day   Myrbetriq 50 mg oral tablet, extended release: 1 tab(s) orally once a day (at bedtime)   apixaban 2.5 mg oral tablet: 1 tab(s) orally 2 times a day. PLEASE RESUME ON 10/14 IN THE AM.   colchicine 0.6 mg oral tablet: 1 tab(s) orally once a day   Ecotrin Adult Low Strength 81 mg oral delayed release tablet: 1 tab(s) orally once a day  fenofibrate 54 mg oral tablet: 1 tab(s) orally once a day  lovastatin 20 mg oral tablet: 1 tab(s) orally once a day  Metoprolol Tartrate 25 mg oral tablet: 0.5 tab(s) orally 2 times a day   Myrbetriq 50 mg oral tablet, extended release: 1 tab(s) orally once a day (at bedtime)   apixaban 2.5 mg oral tablet: 1 tab(s) orally 2 times a day. PLEASE RESUME ON WEDNESDAY 10/13 IN THE EVENING  colchicine 0.6 mg oral tablet: 1 tab(s) orally once a day   Ecotrin Adult Low Strength 81 mg oral delayed release tablet: 1 tab(s) orally once a day  fenofibrate 54 mg oral tablet: 1 tab(s) orally once a day  lovastatin 20 mg oral tablet: 1 tab(s) orally once a day  Metoprolol Tartrate 25 mg oral tablet: 0.5 tab(s) orally 2 times a day   Myrbetriq 50 mg oral tablet, extended release: 1 tab(s) orally once a day (at bedtime)

## 2022-10-11 NOTE — PACU DISCHARGE NOTE - COMMENTS
88 y/o F s/p implantation of PPM under MAC. No anesthesia related complications.     HR: 70  BP: 106/53  SpO2: 98%   RR: 20

## 2022-10-11 NOTE — PROGRESS NOTE ADULT - SUBJECTIVE AND OBJECTIVE BOX
Chief complaint: Patient is a 87y old  Female who presents with a chief complaint of SSS (11 Oct 2022 12:49)    Interval history:  Admitted with 6 second pauses on event monitor  Plan for PPM today for SSS    Review of systems: A complete 10-point review of systems was obtained and is negative except as stated in the interval history.    Vitals:  T(F): 97, Max: 98.6 (10-10 @ 17:27)  HR: 81 (59 - 89)  BP: 152/66 (106/55 - 169/73)  RR: 18 (14 - 18)  SpO2: 97% (97% - 97%)    Ins & outs:     10-10 @ 07:01  -  10-11 @ 07:00  --------------------------------------------------------  IN: 0 mL / OUT: 1500 mL / NET: -1500 mL      Weight trend:  Weight (kg): 60.3 (10-10)    Physical exam:  General: No apparent distress  HEENT: Anicteric sclera. Moist mucous membranes. No JVD  Cardiac: Regular rate and rhythm. No murmurs, rubs, or gallops.   Vascular: Symmetric radial pulses. Dorsalis pedis pulses palpable.   Respiratory: Normal effort. Clear to ascultation.   Abdomen: Soft, nontender. Audible bowel sounds.   Extremities: Warm with No edema. No cyanosis or clubbing.   Skin: Warm and dry. No rash.   Neurologic: Grossly normal motor function.   Psychiatric: Oriented to person, place, and time.     Data reviewed:  - Telemetry: SR no long pauses or AVB  - TTE (date10/11/22):LVEF 56%, Mild MR, Mild TR     - Labs:                        11.7   4.46  )-----------( 171      ( 11 Oct 2022 05:40 )             34.5     10-11    141  |  107  |  16  ----------------------------<  84  4.5   |  26  |  0.7    Ca    9.1      11 Oct 2022 05:40  Mg     1.9     10-11    TPro  6.5  /  Alb  4.2  /  TBili  0.4  /  DBili  x   /  AST  28  /  ALT  12  /  AlkPhos  53  10-10      Troponin T, Serum: <0.01 ng/mL (10-10-22 @ 15:00)    Serum Pro-Brain Natriuretic Peptide: 351 pg/mL (10-10)      Triglycerides, Serum: 68 mg/dL (10-11-22 @ 05:40)  LDL Cholesterol Calculated: 78 mg/dL (10-11-22 @ 05:40)      Urinalysis Basic - ( 10 Oct 2022 13:50 )    Color: Colorless / Appearance: Clear / S.005 / pH: x  Gluc: x / Ketone: Negative  / Bili: Negative / Urobili: <2 mg/dL   Blood: x / Protein: Negative / Nitrite: Negative   Leuk Esterase: Moderate / RBC: 1 /HPF / WBC 2 /HPF   Sq Epi: x / Non Sq Epi: 1 /HPF / Bacteria: Negative        Medications:  aspirin enteric coated 81 milliGRAM(s) Oral daily  atorvastatin 10 milliGRAM(s) Oral at bedtime    Drips:    PRN:     Allergies    penicillin (Hives)  strawberry (Hives)    Intolerances         Chief complaint: Patient is a 87y old Female who presents with a chief complaint of SSS (11 Oct 2022 12:49)    Interval history:  Admitted with 6 second pauses on event monitor  NPO for PPM today for SSS    Review of systems: A complete 10-point review of systems was obtained and is negative except as stated in the interval history.    Vitals:  T(F): 97, Max: 98.6 (10-10 @ 17:27)  HR: 81 (59 - 89)  BP: 152/66 (106/55 - 169/73)  RR: 18 (14 - 18)  SpO2: 97% (97% - 97%)    Ins & outs:     10-10 @ 07:01  -  10-11 @ 07:00  --------------------------------------------------------  IN: 0 mL / OUT: 1500 mL / NET: -1500 mL      Weight trend:  Weight (kg): 60.3 (10-10)    Physical exam:  General: No apparent distress  HEENT: Anicteric sclera. Moist mucous membranes. No JVD  Cardiac: Regular rate and rhythm. No murmurs, rubs, or gallops.   Vascular: Symmetric radial pulses. Dorsalis pedis pulses palpable.   Respiratory: Normal effort. Clear to ascultation.   Abdomen: Soft, nontender. Audible bowel sounds.   Extremities: Warm with No edema. No cyanosis or clubbing.   Skin: Warm and dry. No rash.   Neurologic: Grossly normal motor function.   Psychiatric: Oriented to person, place, and time.     Data reviewed:  - Telemetry: SR no long pauses or AVB  - TTE (date10/11/22):LVEF 56%, Mild MR, Mild TR     - Labs:                        11.7   4.46  )-----------( 171      ( 11 Oct 2022 05:40 )             34.5     10-11    141  |  107  |  16  ----------------------------<  84  4.5   |  26  |  0.7    Ca    9.1      11 Oct 2022 05:40  Mg     1.9     10-11    TPro  6.5  /  Alb  4.2  /  TBili  0.4  /  DBili  x   /  AST  28  /  ALT  12  /  AlkPhos  53  10-10      Troponin T, Serum: <0.01 ng/mL (10-10-22 @ 15:00)    Serum Pro-Brain Natriuretic Peptide: 351 pg/mL (10-10)      Triglycerides, Serum: 68 mg/dL (10-11-22 @ 05:40)  LDL Cholesterol Calculated: 78 mg/dL (10-11-22 @ 05:40)      Urinalysis Basic - ( 10 Oct 2022 13:50 )    Color: Colorless / Appearance: Clear / S.005 / pH: x  Gluc: x / Ketone: Negative  / Bili: Negative / Urobili: <2 mg/dL   Blood: x / Protein: Negative / Nitrite: Negative   Leuk Esterase: Moderate / RBC: 1 /HPF / WBC 2 /HPF   Sq Epi: x / Non Sq Epi: 1 /HPF / Bacteria: Negative        Medications:  aspirin enteric coated 81 milliGRAM(s) Oral daily  atorvastatin 10 milliGRAM(s) Oral at bedtime    Drips:    PRN:     Allergies    penicillin (Hives)  strawberry (Hives)    Intolerances

## 2022-10-11 NOTE — DISCHARGE NOTE PROVIDER - NSDCCPCAREPLAN_GEN_ALL_CORE_FT
PRINCIPAL DISCHARGE DIAGNOSIS  Diagnosis: Sick sinus syndrome  Assessment and Plan of Treatment:

## 2022-10-11 NOTE — DISCHARGE NOTE PROVIDER - CARE PROVIDER_API CALL
Ely Abrams (MD)  Cardiac Electrophysiology; Cardiology; Internal Medicine  [address] [place] [address]  Phone: [phone]  Fax: [phone]  Established Patient  Follow Up Time: 1 month   Ely Abrams (MD)  Cardiac Electrophysiology; Cardiology; Internal Medicine  [address] [place] [address]  Phone: [phone]  Fax: [phone]  Established Patient  Scheduled Appointment: 10/20/2022 01:30 PM

## 2022-10-11 NOTE — PROGRESS NOTE ADULT - ASSESSMENT
Assessment:  Patient is an 87 year old female with PMHx of HLD, PAF on Eliquis who presents with dizzy spells and pauses on event monitor as long as 6 seconds.    Impression    # SSS  Episodes of dizziness and syncope intermittently over the past 1 year  Pauses on MCOT up to 6 seconds  Plan for PPM today  Resume BB Post opt    # PAF  Tachy/Dave Syndrome  Currently SR  CHADS-VASC score 3  Hold Eliquis(Last Dose 10/10/22 am)    # HLD  On Lovastatin at home  Therapeutic interchange-Lipitor 10  Check Fasting Lipids    # Overactive bladder  On Mybetriq at home  Non formulary at [facility]

## 2022-10-11 NOTE — DISCHARGE NOTE PROVIDER - PROVIDER TOKENS
PROVIDER:[TOKEN:[[id number]],FOLLOWUP:[1 month],ESTABLISHEDPATIENT:[T]] PROVIDER:[TOKEN:[[id number]],SCHEDULEDAPPT:[10/20/2022],SCHEDULEDAPPTTIME:[01:30 PM],ESTABLISHEDPATIENT:[T]]

## 2022-10-11 NOTE — DISCHARGE NOTE PROVIDER - NSDCCPTREATMENT_GEN_ALL_CORE_FT
PRINCIPAL PROCEDURE  Procedure: Insertion of DDD cardiac pacemaker  Findings and Treatment:   - You should restart your Eliquis 10/13/22 in am  - Do not shower for  1 week.  - Do not drive or operate heavy machinery for 48H  - Do not submerge in water (example: baths, swimming) for 1 month.  - Do not lift your left arm greater than shoulder height for 6 weeks.  - Do not lift anything heavier than 5-10 lbs with your left arm for 6 weeks.  - Any sudden swelling, redness, fever, discharge, or severe pain, call the Electrophysiology Office at [phone].       PRINCIPAL PROCEDURE  Procedure: Insertion of DDD cardiac pacemaker  Findings and Treatment: - You should restart your Eliquis 10/14/22 in am  - Please followup with Dr. Abrams on 10/20/2022 @ 1:30pm @[facility]   - Do not shower for  1 week.  - Do not drive or operate heavy machinery for 48H  - Do not submerge in water (example: baths, swimming) for 1 month.  - Do not lift your left arm greater than shoulder height for 6 weeks.  - Do not lift anything heavier than 5-10 lbs with your left arm for 6 weeks.  - Any sudden swelling, redness, fever, discharge, or severe pain, call the Electrophysiology Office at [phone].       PRINCIPAL PROCEDURE  Procedure: Insertion of DDD cardiac pacemaker  Findings and Treatment: - You should restart your Eliquis 2.5mg 10/14/22 in am  - Please followup with Dr. Abrams on 10/20/2022 @ 1:30pm @[facility]   - Please take Colchicine 0.6mg daily x3 months   - Please take Metoprolol Tartrate 12.5mg twice daily   - Do not shower for  1 week.  - Do not drive or operate heavy machinery for 48H  - Do not submerge in water (example: baths, swimming) for 1 month.  - Do not lift your left arm greater than shoulder height for 6 weeks.  - Do not lift anything heavier than 5-10 lbs with your left arm for 6 weeks.  - Any sudden swelling, redness, fever, discharge, or severe pain, call the Electrophysiology Office at [phone].       PRINCIPAL PROCEDURE  Procedure: Insertion of DDD cardiac pacemaker  Findings and Treatment: - You should restart your Eliquis 2.5mg on Wednesday 10/13/22 in the evening.   - Please followup with Dr. Abrams on 10/20/2022 @ 1:30pm @[facility]   - Please take Colchicine 0.6mg daily x3 months   - Please take Metoprolol Tartrate 12.5mg twice daily   - Do not shower for  1 week.  - Do not drive or operate heavy machinery for 48H  - Do not submerge in water (example: baths, swimming) for 1 month.  - Do not lift your left arm greater than shoulder height for 6 weeks.  - Do not lift anything heavier than 5-10 lbs with your left arm for 6 weeks.  - Any sudden swelling, redness, fever, discharge, or severe pain, call the Electrophysiology Office at [phone].

## 2022-10-11 NOTE — DISCHARGE NOTE PROVIDER - HOSPITAL COURSE
Patient is an 87 year old female with PMHx of overactive bladder, HLD, PAF, on Eliquis, who has been experiencing episodes of intermittent dizziness and isolated syncopal episode. MCOT was ordered and revealed a 6 second sinus pause.       chest pain, sob, LE edema, palpitations. She denies any previous CAD/CHF/MI.    In ED 12 Lead EKG showed SR with NSST changes. Troponin was negative x1 and BNP was 351. She is being admitted to cardiac telemetry for further management    2D echo 1/5/2022  LVEF 60-65%, Mild-Mod TR   Patient is an 87 year old female with PMHx of overactive bladder, HLD, PAF, on Eliquis, who has been experiencing episodes of intermittent dizziness and isolated syncopal episode. MCOT was ordered and revealed a 6 second sinus pause. Patient was referred to [facility] ED where she was admitted. Betablocker and Eliquis was held. TTE 10/11/22 showed preserved LV function. On 10/11/22 she underwent [facility] DC PPM implantation. Patient tolerated procedure well. She was observed overnight. POD 1 she remains HD stable with no complaints. Examination of pacer pocked is C/D/I with no hematoma or erythema. CXR was negative for PTX and device interrogation shows normal function. She is instructed to resume eliquis on 10/13/22 in the AM. She is being discharged home in stable condition.       Patient is an 87 year old female with PMHx of overactive bladder, HLD, PAF, on Eliquis, who has been experiencing episodes of intermittent dizziness and isolated syncopal episode. MCOT was ordered and revealed a 6 second sinus pause. Patient was referred to [facility] ED where she was admitted. Betablocker and Eliquis was held. TTE 10/11/22 showed preserved LV function. On 10/11/22 she underwent [facility] [place] DC PPM implantation. Patient tolerated procedure well. She was observed overnight. POD 1 she remains HD stable with no complaints. Examination of pacer pocked is C/D/I with no hematoma or erythema. CXR was negative for PTX and device interrogation shows normal function. Limited TTE ordered ****. Patient started on colchicine 0.6mg daily for pleuritic chest pain. She is instructed to resume eliquis on 10/13/22 in the AM. She is being discharged home in stable condition.       Patient is an 87 year old female with PMHx of overactive bladder, HLD, PAF, on Eliquis, who has been experiencing episodes of intermittent dizziness and isolated syncopal episode. MCOT was ordered and revealed a 6 second sinus pause. Patient was referred to [facility] ED where she was admitted. Betablocker and Eliquis was held. TTE 10/11/22 showed preserved LV function. On 10/11/22 she underwent [facility] [place] DC PPM implantation. Patient tolerated procedure well. She was observed overnight. POD 1 she remains HD stable with no complaints. Examination of pacer pocked is C/D/I with no hematoma or erythema. CXR was negative for PTX and device interrogation shows normal function. Limited TTE ordered   . Patient started on colchicine 0.6mg daily for pleuritic chest pain. She is instructed to resume eliquis on 10/13/22 in the AM. She is being discharged home in stable condition.       Patient is an 87 year old female with PMHx of overactive bladder, HLD, PAF, on Eliquis, who has been experiencing episodes of intermittent dizziness and isolated syncopal episode. MCOT was ordered and revealed a 6 second sinus pause. Patient was referred to [facility] ED where she was admitted. Beta blocker and Eliquis were held. TTE 10/11/22 showed preserved LV function. On 10/11/22 she underwent [facility] [place] DC PPM implantation.     Patient tolerated procedure well. She was observed overnight. POD 1 she remains HD stable with no complaints. Examination of pacer pocked is C/D/I with no hematoma or erythema. CXR was negative for PTX and device interrogation shows normal function. Limited TTE with trivial pericardial effusion. Given complaints of pleuritic chest pain, patient started on colchicine 0.6mg daily. She was instructed to resume Eliquis 2.5 mg BID on 10/13/22 in the PM. Orthostatic vital signs were positive (HR increased from 70 bpm when supine to 102 bpm when standing), and therefore patient's Lopressor dose was decreased to 12.5 mg BID. She is being discharged home in stable condition.

## 2022-10-12 ENCOUNTER — TRANSCRIPTION ENCOUNTER (OUTPATIENT)
Age: 87
End: 2022-10-12

## 2022-10-12 VITALS
HEART RATE: 65 BPM | OXYGEN SATURATION: 97 % | SYSTOLIC BLOOD PRESSURE: 112 MMHG | DIASTOLIC BLOOD PRESSURE: 57 MMHG | TEMPERATURE: 99 F

## 2022-10-12 LAB
ANION GAP SERPL CALC-SCNC: 11 MMOL/L — SIGNIFICANT CHANGE UP (ref 7–14)
BUN SERPL-MCNC: 14 MG/DL — SIGNIFICANT CHANGE UP (ref 10–20)
CALCIUM SERPL-MCNC: 8.8 MG/DL — SIGNIFICANT CHANGE UP (ref 8.4–10.5)
CHLORIDE SERPL-SCNC: 101 MMOL/L — SIGNIFICANT CHANGE UP (ref 98–110)
CO2 SERPL-SCNC: 21 MMOL/L — SIGNIFICANT CHANGE UP (ref 17–32)
CREAT SERPL-MCNC: 0.7 MG/DL — SIGNIFICANT CHANGE UP (ref 0.7–1.5)
EGFR: 84 ML/MIN/1.73M2 — SIGNIFICANT CHANGE UP
GLUCOSE SERPL-MCNC: 87 MG/DL — SIGNIFICANT CHANGE UP (ref 70–99)
HCT VFR BLD CALC: 38.9 % — SIGNIFICANT CHANGE UP (ref 37–47)
HGB BLD-MCNC: 12.9 G/DL — SIGNIFICANT CHANGE UP (ref 12–16)
MCHC RBC-ENTMCNC: 29.7 PG — SIGNIFICANT CHANGE UP (ref 27–31)
MCHC RBC-ENTMCNC: 33.2 G/DL — SIGNIFICANT CHANGE UP (ref 32–37)
MCV RBC AUTO: 89.6 FL — SIGNIFICANT CHANGE UP (ref 81–99)
NRBC # BLD: 0 /100 WBCS — SIGNIFICANT CHANGE UP (ref 0–0)
PLATELET # BLD AUTO: 177 K/UL — SIGNIFICANT CHANGE UP (ref 130–400)
POTASSIUM SERPL-MCNC: 4.3 MMOL/L — SIGNIFICANT CHANGE UP (ref 3.5–5)
POTASSIUM SERPL-SCNC: 4.3 MMOL/L — SIGNIFICANT CHANGE UP (ref 3.5–5)
RBC # BLD: 4.34 M/UL — SIGNIFICANT CHANGE UP (ref 4.2–5.4)
RBC # FLD: 13.4 % — SIGNIFICANT CHANGE UP (ref 11.5–14.5)
SODIUM SERPL-SCNC: 133 MMOL/L — LOW (ref 135–146)
WBC # BLD: 6.09 K/UL — SIGNIFICANT CHANGE UP (ref 4.8–10.8)
WBC # FLD AUTO: 6.09 K/UL — SIGNIFICANT CHANGE UP (ref 4.8–10.8)

## 2022-10-12 PROCEDURE — 93280 PM DEVICE PROGR EVAL DUAL: CPT | Mod: 26

## 2022-10-12 PROCEDURE — 99233 SBSQ HOSP IP/OBS HIGH 50: CPT | Mod: 24

## 2022-10-12 PROCEDURE — 93306 TTE W/DOPPLER COMPLETE: CPT | Mod: 26

## 2022-10-12 PROCEDURE — 71046 X-RAY EXAM CHEST 2 VIEWS: CPT | Mod: 26

## 2022-10-12 PROCEDURE — 99239 HOSP IP/OBS DSCHRG MGMT >30: CPT

## 2022-10-12 RX ORDER — COLCHICINE 0.6 MG
1 TABLET ORAL
Qty: 30 | Refills: 0
Start: 2022-10-12 | End: 2022-11-10

## 2022-10-12 RX ORDER — APIXABAN 2.5 MG/1
1 TABLET, FILM COATED ORAL
Qty: 60 | Refills: 0
Start: 2022-10-12 | End: 2022-11-10

## 2022-10-12 RX ORDER — METOPROLOL TARTRATE 50 MG
0.5 TABLET ORAL
Qty: 30 | Refills: 0
Start: 2022-10-12 | End: 2022-11-10

## 2022-10-12 RX ORDER — COLCHICINE 0.6 MG
0.6 TABLET ORAL DAILY
Refills: 0 | Status: DISCONTINUED | OUTPATIENT
Start: 2022-10-12 | End: 2022-10-12

## 2022-10-12 RX ORDER — KETOROLAC TROMETHAMINE 30 MG/ML
15 SYRINGE (ML) INJECTION ONCE
Refills: 0 | Status: DISCONTINUED | OUTPATIENT
Start: 2022-10-12 | End: 2022-10-12

## 2022-10-12 RX ORDER — APIXABAN 2.5 MG/1
1 TABLET, FILM COATED ORAL
Qty: 0 | Refills: 0 | DISCHARGE

## 2022-10-12 RX ORDER — COLCHICINE 0.6 MG
1 TABLET ORAL
Qty: 30 | Refills: 2
Start: 2022-10-12 | End: 2023-01-09

## 2022-10-12 RX ORDER — METOPROLOL TARTRATE 50 MG
12.5 TABLET ORAL
Refills: 0 | Status: DISCONTINUED | OUTPATIENT
Start: 2022-10-12 | End: 2022-10-12

## 2022-10-12 RX ADMIN — Medication 81 MILLIGRAM(S): at 11:54

## 2022-10-12 RX ADMIN — Medication 0.6 MILLIGRAM(S): at 14:51

## 2022-10-12 RX ADMIN — Medication 15 MILLIGRAM(S): at 00:45

## 2022-10-12 RX ADMIN — Medication 250 MILLIGRAM(S): at 03:10

## 2022-10-12 RX ADMIN — Medication 15 MILLIGRAM(S): at 00:59

## 2022-10-12 RX ADMIN — Medication 12.5 MILLIGRAM(S): at 10:06

## 2022-10-12 NOTE — CHART NOTE - NSCHARTNOTEFT_GEN_A_CORE
Overnight patient had some mild chest pain. Pain pleuritic in nature. Worse with inspiration. Patient appears anxious.  Site C/D/I no hematoma.  12 Lead EKG performed. Patient in AF with controlled rates.  Toradol 15mg IVPx1 given with improvement of symptoms

## 2022-10-12 NOTE — PROGRESS NOTE ADULT - ASSESSMENT
Cardiology" Dr. Bryant    This is a 88 yo female with PMH HLD, HTN, AF (Eliquis), syncope who presents with presyncopal episode on Saturday, pt had 6 sec pause.   Patient s/p DC PPM (SJM) implant    Impression:  Sick sinus syndrome with 6 sec pause on MCOT  s/p DC PPM  Hx HTN, HLD, AF  Hx syncope      con't current management  - CXR as above  - Device interrogation done, properly working device, review by attending  - FU in the EP office for wound check with ___NP in 1 week      No Heavy lifting >5 lbs. Do not raise your arm above shoulder level for 4-6 weeks.   No driving for 2weeks  No shower, bathtub, no wetting device site until f/u appointment .  DO NOT remove dressing until  that time, leave Steri-strips in place

## 2022-10-12 NOTE — PROVIDER CONTACT NOTE (OTHER) - ASSESSMENT
Pt s/p PPM placement 10/11/22. Pt given tylenol po earlier for "arthritic" pain to R arm/shoulder. Pt now sts this pain is worse and in center of her chest.

## 2022-10-12 NOTE — DISCHARGE NOTE NURSING/CASE MANAGEMENT/SOCIAL WORK - NSDCPEELIQUISREACT_GEN_ALL_CORE
Kamar Turpin  1948  5/29/2019      Patient is here for followup visit after  bilateral L3,L4,L5,S1 medial branch nerve injection done. Patient obtained 80% good pain relief from the injection but is limited in duration.  Continues to have low back pain. Denies any new areas of pain or injury.  There is good improvement in patient's function and ADL (activities of daily living) such as walking, sitting and doing work. Therefore, patient is considered a good candidate for RFA ablation of the medial branch nerves supplying the facet joints to obtain longer term pain relief.    We reviewed the patient's MRI findings again and discussed alternative treatment options.    We also discussed other forms of non-operative care including a home exercise program, physical therapy, chiropractic care, acupuncture, steroid injections, and medical management.      Review of systems: As above    ALLERGIES:   Allergen Reactions   • Onion GI UPSET and DIARRHEA   • Penicillins HIVES and RASH   • Sulfa Antibiotics HIVES and RASH       Current Outpatient Medications   Medication Sig Dispense Refill   • fenofibrate (TRICOR) 145 MG tablet TAKE ONE TABLET BY MOUTH DAILY 90 tablet 0   • gabapentin (NEURONTIN) 600 MG tablet Take 3 tablets by mouth nightly. 270 tablet 1   • DULoxetine (CYMBALTA) 20 MG capsule Take 2 capsules by mouth daily. 180 capsule 1   • omeprazole (PRILOSEC) 20 MG capsule Take 1 capsule by mouth daily. 90 capsule 0   • warfarin (COUMADIN) 4 MG tablet Take 6mg (tablet & half) every Friday and take 4mg (one tablet) all remaining days of the week or as directed by anticoagulation clinic. 100 tablet 1   • tamsulosin (FLOMAX) 0.4 MG Cap TAKE ONE CAPSULE BY MOUTH TWICE DAILY 180 capsule 1   • diclofenac 2 % cream Apply 1-2 gram (1-2 pumps) to painful areas 4 times daily as needed; max 2 pumps per dose; max 8 pumps per day 120 g 5   • losartan (COZAAR) 25 MG tablet Take 1 tablet by mouth daily.     • AMIODarone  (PACERONE,CORDARONE) 200 MG tablet Take 1 tablet by mouth daily. 90 tablet 4   • metoPROLOL succinate (TOPROL-XL) 50 MG 24 hr tablet Take 1 tablet by mouth daily. 90 tablet 4   • acetaminophen (TYLENOL) 500 MG tablet Take 1 tablet by mouth every 6 hours as needed for Pain. 30 tablet 0   • atorvastatin (LIPITOR) 80 MG tablet TAKE ONE TABLET BY MOUTH DAILY (Patient taking differently: Take 40 mg by mouth daily. ) 90 tablet 1   • Triamcinolone Acetonide (NASACORT AQ NA) Spray 2 sprays in each nostril nightly.      • Omega-3 Fatty Acids (FISH OIL PO) Take 1,000 mg by mouth daily.      • Multiple Vitamins-Minerals (MULTIVITAMIN PO) Take 1 tablet by mouth daily.      • Glucosamine-Chondroitin (GLUCOSAMINE CHONDR COMPLEX PO) Take 1 tablet by mouth daily.        No current facility-administered medications for this visit.        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    • Ganglion cyst     on thumb   • Gastritis    • GERD (gastroesophageal reflux disease)    • H/O degenerative disc disease    • Heart murmur    • Hypercholesterolemia    • Hyperlipidemia    • Hypertension    • IBS (irritable bowel syndrome)    • Pneumonia    • Seizure (CMS/HCC)     siezure in recovery after surgery (30 yrs ago after neck surgery)   • Sinusitis, chronic    • Sleep apnea     cpap    • Spinal stenosis, multiple sites in spine    • Spondylosis    • Syncope    • Weakness 6/28/2016         Physical Examination:  Visit Vitals  Ht 6' 1\" (1.854 m)   Wt 214 lb 1.1 oz (97.1 kg)   BMI 28.24 kg/m²     Awake, alert, able to  ambulate without assistive device, normal gait.  Not in acute distress.  Neck- Supple, no limitation in range of motion, paracervical tenderness.  Heart- Regular rate and rhythm.  Respiratory- Lungs clear to auscultation.  Abdomen- Soft, nontender, no masses.  Back- facet joint and paraspinal tenderness lower lumbar areas, flexion and extension of lumbar spine with more pain.  Extremities- No sensory or motor deficits, reflexes 2/4 symmetrical.    Assessment:    bilateral chronic low back pain  Lumbar spondylosis without myelopathy  Lumbar Facet joint pain    Lumbar facet joint arthropathy      Plan:  I recommend radiofrequency ablation of bilateral L3-4,L4-5,L5-S1 facet joint medial branch nerves  under fluoroscopic guidance. I recommend that the most painful right side to be done first. Procedure to be done under moderate sedation and local anesthesia to reduce anxiety.    No refills of any medications needed today.    Patient is encouraged to perform stretching and exercise regimen as previously outlined in physical therapy.      Goals:  Reduce pain, improve function and quality of life, able to keep employment or perform daily activities of living with less pain as well as decrease the need for pain medications.    The patient agrees with the above outlined plan and denies having any unanswered questions at this time. Patient may contact our office if there are any further questions or concerns, patient may contact my office at anytime.     Greater than 50% of this time was spent counseling the patient and coordinating care. All questions were answered and patient understood and agreed with the treatment plan.           Apixaban/Eliquis increases your risk for bleeding. Notify your doctor if you experience any of the following side effects: bleeding, coughing or vomiting blood, red or black stool, unexpected pain or swelling, itching or hives, chest pain, chest tightness, trouble breathing, changes in how much or how often you urinate, red or pink urine, numbness or tingling in your feet, or unusual muscle weakness. When Apixaban/Eliquis is taken with other medicines, they can affect how it works. Taking other medications such as aspirin, blood thinners, nonsteroidal anti-inflammatories, and medications that treat depression can increase your risk of bleeding. It is very important to tell your health care provider about all of the other medicines, including over-the-counter medications, herbs, and vitamins you are taking. DO NOT start, stop, or change the dosage of any medicine, including over-the-counter medicines, vitamins, and herbal products without your doctor’s approval. Any products containing aspirin or are nonsteroidal anti-inflammatories lessen the blood’s ability to form clots and add to the effect of Apixaban/Eliquis. Never take aspirin or medicines that contain aspirin without speaking to your doctor.

## 2022-10-12 NOTE — PROGRESS NOTE ADULT - SUBJECTIVE AND OBJECTIVE BOX
INTERVAL HPI/OVERNIGHT EVENTS:    Patient s/p DC PPM implant  No event over night. Pt without complains    MEDICATIONS  (STANDING):  aspirin enteric coated 81 milliGRAM(s) Oral daily  atorvastatin 10 milliGRAM(s) Oral at bedtime  colchicine 0.6 milliGRAM(s) Oral daily  metoprolol tartrate 12.5 milliGRAM(s) Oral two times a day    MEDICATIONS  (PRN):      Allergies    penicillin (Hives)  strawberry (Hives)    Intolerances          Vital Signs Last 24 Hrs  T(C): 36 (12 Oct 2022 14:55), Max: 36.7 (11 Oct 2022 17:34)  T(F): 96.8 (12 Oct 2022 14:55), Max: 98 (11 Oct 2022 17:34)  HR: 72 (12 Oct 2022 14:55) (60 - 80)  BP: 128/63 (12 Oct 2022 14:55) (88/49 - 128/63)  BP(mean): 88 (12 Oct 2022 14:55) (63 - 88)  RR: 18 (12 Oct 2022 14:55) (18 - 24)  SpO2: 97% (12 Oct 2022 14:55) (96% - 97%)    Parameters below as of 12 Oct 2022 14:55  Patient On (Oxygen Delivery Method): room air        GENERAL: In no apparent distress, well nourished, and hydrated.  HEART: Regular rate and rhythm; No murmurs, rubs, or gallops.  	Wound healing well; No hematoma; no bleeding  PULMONARY: Clear to auscultation and perfusion.  No rales, wheezing, or rhonchi bilaterally.  ABDOMEN: Soft, Nontender, Nondistended; Bowel sounds present  EXTREMITIES:  2+ Peripheral Pulses, No clubbing, cyanosis, or edema  NEUROLOGICAL: Grossly nonfocal    12 Lead ECG:   Ventricular Rate 72 BPM    Atrial Rate 72 BPM    P-R Interval 182 ms    QRS Duration 84 ms    Q-T Interval 380 ms    QTC Calculation(Bazett) 416 ms    P Axis 90 degrees    R Axis -6 degrees    T Axis 77 degrees    Diagnosis Line Normal sinus rhythm  Normal ECG    Confirmed by Soledad Ledezma MD ([id number]) on 10/11/2022 5:59:27 AM (10-10-22 @ 11:54)      RADIOLOGY & ADDITIONAL TESTS:  < from: Xray Chest 2 Views PA/Lat (10.12.22 @ 08:08) >  FINDINGS:  Support devices: Redemonstration of dual-lead pacemaker with leads in the   expected location of the right atrial appendage and right ventricle.   Cardiac/mediastinum/hilum: Unremarkable.    Lung parenchyma/Pleura: The lungs are clear. No pleural effusion, or   pneumothorax.    Skeleton/soft tissues: No acute osseous abnormality. Degenerative changes   of the spine.      IMPRESSION:  1.  Dual-lead pacemaker in place.  2.  No pneumothorax..    < end of copied text >

## 2022-10-12 NOTE — DISCHARGE NOTE NURSING/CASE MANAGEMENT/SOCIAL WORK - PATIENT PORTAL LINK FT
You can access the FollowMyHealth Patient Portal offered by [facility] by registering at the following website: [email or web address]. By joining [employer]’s FollowMyHealth portal, you will also be able to view your health information using other applications (apps) compatible with our system.

## 2022-10-12 NOTE — DISCHARGE NOTE NURSING/CASE MANAGEMENT/SOCIAL WORK - NSDCPEFALRISK_GEN_ALL_CORE
For information on Fall & Injury Prevention, visit: [email or web address] OR  [email or web address] OR  https://www.cdc.gov/steadi/patient.html

## 2022-10-12 NOTE — PROGRESS NOTE ADULT - NS ATTEND AMEND GEN_ALL_CORE FT
Patient was NPO this morning, and underwent successful placement of PPM with Dr. Abrams. Will review STAT CXR after PPM insertion today. Plan for device interrogation, ECG, and PA/lateral CXR tomorrow morning before discharge. Will evaluate timing for restarting Eliquis tomorrow morning.
Mild chest pain (overall whole chest) resolved quickly with 1 dose of toradol  TTE with trivial effusion  CXR without pneumothorax    - DC home today  - Hold OAC for 48 hours

## 2022-10-13 ENCOUNTER — NON-APPOINTMENT (OUTPATIENT)
Age: 87
End: 2022-10-13

## 2022-10-20 ENCOUNTER — APPOINTMENT (OUTPATIENT)
Dept: CARDIOLOGY | Facility: CLINIC | Age: 87
End: 2022-10-20

## 2022-10-20 VITALS
HEART RATE: 73 BPM | BODY MASS INDEX: 23.04 KG/M2 | WEIGHT: 130 LBS | HEIGHT: 63 IN | DIASTOLIC BLOOD PRESSURE: 84 MMHG | TEMPERATURE: 98 F | SYSTOLIC BLOOD PRESSURE: 139 MMHG | RESPIRATION RATE: 16 BRPM

## 2022-10-20 PROCEDURE — 93000 ELECTROCARDIOGRAM COMPLETE: CPT | Mod: 59

## 2022-10-20 PROCEDURE — 99214 OFFICE O/P EST MOD 30 MIN: CPT | Mod: 24

## 2022-10-20 PROCEDURE — 93280 PM DEVICE PROGR EVAL DUAL: CPT

## 2022-10-20 RX ORDER — LOVASTATIN 20 MG
20 TABLET ORAL
Refills: 0 | Status: DISCONTINUED | COMMUNITY
End: 2022-10-20

## 2022-10-20 RX ORDER — METOPROLOL SUCCINATE 25 MG/1
25 TABLET, EXTENDED RELEASE ORAL DAILY
Qty: 90 | Refills: 0 | Status: DISCONTINUED | COMMUNITY
Start: 2022-10-06 | End: 2022-10-20

## 2022-10-20 RX ORDER — METOPROLOL SUCCINATE 50 MG/1
50 TABLET, EXTENDED RELEASE ORAL
Refills: 0 | Status: DISCONTINUED | COMMUNITY
End: 2022-10-20

## 2022-10-20 RX ORDER — COLCHICINE 0.6 MG/1
0.6 TABLET ORAL DAILY
Refills: 0 | Status: DISCONTINUED | COMMUNITY

## 2022-10-22 DIAGNOSIS — Z88.0 ALLERGY STATUS TO PENICILLIN: ICD-10-CM

## 2022-10-22 DIAGNOSIS — Z79.01 LONG TERM (CURRENT) USE OF ANTICOAGULANTS: ICD-10-CM

## 2022-10-22 DIAGNOSIS — N32.81 OVERACTIVE BLADDER: ICD-10-CM

## 2022-10-22 DIAGNOSIS — I49.5 SICK SINUS SYNDROME: ICD-10-CM

## 2022-10-22 DIAGNOSIS — I48.0 PAROXYSMAL ATRIAL FIBRILLATION: ICD-10-CM

## 2022-10-22 DIAGNOSIS — E78.5 HYPERLIPIDEMIA, UNSPECIFIED: ICD-10-CM

## 2022-10-22 DIAGNOSIS — Z87.891 PERSONAL HISTORY OF NICOTINE DEPENDENCE: ICD-10-CM

## 2022-10-24 ENCOUNTER — APPOINTMENT (OUTPATIENT)
Dept: CARDIOLOGY | Facility: CLINIC | Age: 87
End: 2022-10-24

## 2022-10-24 VITALS — OXYGEN SATURATION: 97 % | SYSTOLIC BLOOD PRESSURE: 123 MMHG | DIASTOLIC BLOOD PRESSURE: 76 MMHG | HEART RATE: 72 BPM

## 2022-10-24 VITALS — WEIGHT: 132 LBS | BODY MASS INDEX: 23.39 KG/M2 | HEIGHT: 63 IN

## 2022-10-24 DIAGNOSIS — R42 DIZZINESS AND GIDDINESS: ICD-10-CM

## 2022-10-24 DIAGNOSIS — Z23 ENCOUNTER FOR IMMUNIZATION: ICD-10-CM

## 2022-10-24 PROCEDURE — 99213 OFFICE O/P EST LOW 20 MIN: CPT

## 2022-10-24 PROCEDURE — G0008: CPT

## 2022-10-24 PROCEDURE — 90662 IIV NO PRSV INCREASED AG IM: CPT

## 2022-10-24 NOTE — REVIEW OF SYSTEMS
[SOB] : shortness of breath [Dizziness] : dizziness [Negative] : Heme/Lymph [Dyspnea on exertion] : not dyspnea during exertion [de-identified] : lightheadedness

## 2022-10-24 NOTE — PHYSICAL EXAM
[Well Developed] : well developed [Well Nourished] : well nourished [No Acute Distress] : no acute distress [Normal Conjunctiva] : normal conjunctiva [Normal Venous Pressure] : normal venous pressure [5th Left ICS - MCL] : palpated at the 5th LICS in the midclavicular line [Normal] : normal [No Precordial Heave] : no precordial heave was noted [Normal Rate] : normal [Irregularly Irregular] : irregularly irregular [Normal S1] : normal S1 [Normal S2] : normal S2 [No Murmur] : no murmurs heard [No Pitting Edema] : no pitting edema present [2+] : left 2+ [Clear Lung Fields] : clear lung fields [Good Air Entry] : good air entry [No Respiratory Distress] : no respiratory distress  [Soft] : abdomen soft [Non Tender] : non-tender [Normal Bowel Sounds] : normal bowel sounds [Normal Gait] : normal gait [No Edema] : no edema [No Varicosities] : no varicosities [No Rash] : no rash [Moves all extremities] : moves all extremities [No Focal Deficits] : no focal deficits [Alert and Oriented] : alert and oriented [de-identified] : Right upper chest wall incision, well healing.

## 2022-10-24 NOTE — HISTORY OF PRESENT ILLNESS
[FreeTextEntry1] : Ms. Samaniego is an 85yo F with PMHx of HTN, HLD, AF, TIA (many years ago) who presents to establish care. Her PMD is Dr. Igor Bass and previously followed with Dr. Luis Espino. Patient was admitted to [facility] in January 2022 for syncope work up. She has been feeling lightheaded at times along with SOB. She feels like she needs to take a deep breath to catch her breath. Lightheadedness has been occurring a few times a day, a couple days a week. She denies syncope but has had near syncope. \par -Patient found to have 6s pause on MCOT. Sent to [facility] for PPM placement. She underwent dual chamber [facility] PPM on 10/11/22. She had CP post procedure and was being treated for possible pericarditis. She completed colchicine. She had an episode of CP the other morning after burping. It lasted for a few minutes, then subsided. Denies further pain.

## 2022-10-24 NOTE — ASSESSMENT
[FreeTextEntry1] : Lightheadedness: Pt with pAF, currently in AF. Pt with 6s pauses. Likely cause of symptoms. S/P PPM\par -Continue to follow with EP for PPM checks. \par \par AF: Rate controlled. [id number]=7\par -Continue with eliquis 5mg PO BID.\par -Currently on borderline for dose adjustment with Age >80 and weight ~60 kg. \par -Discussed with patient risk of lowering dose with weight on border, given hx of TIA, could underdose and increase stroke risk.\par -No bleeding history. \par -Discussed with patient to monitor weight closely and check for any signs of bleeding.\par -If patient continues to lose weight, will consider decreasing dose to 2.5mg PO daily.\par -Continue with metoprolol tartrate 25mg PO BID.\par -If continued palpitations, will increase as needed. \par \par HTN: BP at goal per ACC/AHA 2018 guidelines\par -Continue with metoprolol tartrate 25mg PO BID. \par \par HLD: Need labs\par -Continue with lovastatin 20mg PO and fenofibrate 54mg PO daily. \par -Check labs. \par \par Follow up in 3 months.

## 2022-10-24 NOTE — REASON FOR VISIT
[Other: ____] : [unfilled] [FreeTextEntry1] : Diagnostic Tests:\par ---------------------\par EKG: \par 10/20/22-AF with PVCs vs aberrant conduction. Low voltage, precordial leads. \par 10/06/22-AF. LAD. \par 11/23/16-NSR. Low voltage, precordial leads. \par ---------------------\par Echo:\par 10/11/22-TTE: EF 56%. Mild MV thickening. Mild MR, TR. PASP 35 mmHg, borderline pHTN. Mild aortic root calcification. \par 01/05/22-TTE: EF 60-65%. Aortic sclerosis. IAS aneurysm. Mild-mod TR. PASP 43.8 mmHg. \par ---------------------\par US:\par 01/04/22-Carotid: 20-39% B/L ICA stenosis. \par 11/30/16-Carotid: 20-39% B/L ICA stenosis.

## 2022-10-30 NOTE — PROCEDURE
[No] : not [Atrial Fibrillation] : atrial fibrillation [Pacemaker] : pacemaker [DDDR] : DDDR [Voltage: ___ volts] : Voltage was [unfilled] volts [Magnet Rate: ___ Ppm] : magnet rate was [unfilled] Ppm [Longevity: ___ months] : The estimated remaining battery life is [unfilled] months [Threshold Testing Performed] : Threshold testing was performed [Lead Imp:  ___ohms] : lead impedance was [unfilled] ohms [Sensing Amplitude ___mv] : sensing amplitude was [unfilled] mv [___V @] : [unfilled] V [___ ms] : [unfilled] ms [Programmed for Longevity] : output reprogrammed for improved battery longevity [Apace-Vpace ___ %] : Apace-Vpace [unfilled]% [de-identified] : [facility] [facility] [employer] [de-identified] : [id number] [de-identified] : [id number] [de-identified] : 10/11/2022 [de-identified] : 60 [de-identified] : [employer]. AF burden 73%

## 2022-10-30 NOTE — ASSESSMENT
[FreeTextEntry1] : ## paroxysmal atrial fibrillation \par ## Tachy-erica syndrome s/p DC-PPM (SJM, 22, Non-dep)\par \par - PPM interrogation shows normally functioning DC-PPM. Battery life ok. No new events. \par - - On Eliquis. Compliant. No bleeding issues. Patient to contact us if there is any bleeding issues, interruption or any issues with OAC. Patient to go to ER/call 911 if any major bleeding. Age > 80, weight ~ 60 kg. We had detailed discussion (and w/ Dr. Bryant at separate conversation). We decided to continue with 5 mg PO q12h. If further significant weight loss, we will reduce dose to 2.5 mg q12h\par - Continue with Metoprolol\par - Remote Monitoring \par - Return in 3 months

## 2022-10-30 NOTE — HISTORY OF PRESENT ILLNESS
[de-identified] : Ms. BENAVIDES is a 87 year-year old female with history of HTN, DL, paroxysmal atrial fibrillation, Tachy-erica syndrome s/p DC-PPM (SJM, 22, Non-dep), TIA is here for Atrial Fibrillation.\par \par + Syncope/Dizziness- associated with conversion pauses. No episodes since PPM.\par \par Denies chest pain, shortness of breath, palpitation, dizziness or LOC except noted above.\par \par EKG (10/20/22): AF 81\par TTE (10/22): Nl EF, Mod LAE\par Cardio: Dr. Bryant\par

## 2022-11-09 ENCOUNTER — APPOINTMENT (OUTPATIENT)
Dept: CARDIOLOGY | Facility: CLINIC | Age: 87
End: 2022-11-09

## 2022-11-09 PROCEDURE — 93228 REMOTE 30 DAY ECG REV/REPORT: CPT

## 2022-11-10 ENCOUNTER — APPOINTMENT (OUTPATIENT)
Dept: CARDIOLOGY | Facility: CLINIC | Age: 87
End: 2022-11-10

## 2023-01-05 ENCOUNTER — APPOINTMENT (OUTPATIENT)
Dept: ELECTROPHYSIOLOGY | Facility: CLINIC | Age: 88
End: 2023-01-05
Payer: MEDICARE

## 2023-01-05 VITALS
DIASTOLIC BLOOD PRESSURE: 83 MMHG | SYSTOLIC BLOOD PRESSURE: 130 MMHG | HEART RATE: 80 BPM | BODY MASS INDEX: 23.55 KG/M2 | TEMPERATURE: 97.3 F | HEIGHT: 62 IN | WEIGHT: 128 LBS

## 2023-01-05 VITALS
HEIGHT: 62 IN | WEIGHT: 128 LBS | HEART RATE: 80 BPM | TEMPERATURE: 97.3 F | DIASTOLIC BLOOD PRESSURE: 83 MMHG | BODY MASS INDEX: 23.55 KG/M2 | SYSTOLIC BLOOD PRESSURE: 130 MMHG

## 2023-01-05 PROCEDURE — 93000 ELECTROCARDIOGRAM COMPLETE: CPT | Mod: 59

## 2023-01-05 PROCEDURE — 93280 PM DEVICE PROGR EVAL DUAL: CPT

## 2023-01-05 PROCEDURE — 99214 OFFICE O/P EST MOD 30 MIN: CPT | Mod: 24

## 2023-01-05 RX ORDER — ASPIRIN 81 MG
81 TABLET, DELAYED RELEASE (ENTERIC COATED) ORAL DAILY
Refills: 0 | Status: COMPLETED | COMMUNITY
End: 2023-01-05

## 2023-01-09 ENCOUNTER — APPOINTMENT (OUTPATIENT)
Dept: CARDIOLOGY | Facility: CLINIC | Age: 88
End: 2023-01-09
Payer: MEDICARE

## 2023-01-09 VITALS
BODY MASS INDEX: 23.55 KG/M2 | DIASTOLIC BLOOD PRESSURE: 78 MMHG | SYSTOLIC BLOOD PRESSURE: 122 MMHG | HEIGHT: 62 IN | WEIGHT: 128 LBS

## 2023-01-09 PROCEDURE — 99213 OFFICE O/P EST LOW 20 MIN: CPT

## 2023-01-09 PROCEDURE — 93000 ELECTROCARDIOGRAM COMPLETE: CPT

## 2023-01-09 NOTE — REASON FOR VISIT
[FreeTextEntry1] : Diagnostic Tests:\par ---------------------\par EKG: \par 01/09/23-AF with paced beat at 76 bpm. Low voltage, precordial leads. \par 10/20/22-AF with PVCs vs aberrant conduction. Low voltage, precordial leads. \par 10/06/22-AF. LAD. \par 11/23/16-NSR. Low voltage, precordial leads. \par ---------------------\par Echo:\par 10/11/22-TTE: EF 56%. Mild MV thickening. Mild MR, TR. PASP 35 mmHg, borderline pHTN. Mild aortic root calcification. \par 01/05/22-TTE: EF 60-65%. Aortic sclerosis. IAS aneurysm. Mild-mod TR. PASP 43.8 mmHg. \par ---------------------\par US:\par 01/04/22-Carotid: 20-39% B/L ICA stenosis. \par 11/30/16-Carotid: 20-39% B/L ICA stenosis. \par ---------------------\par Holter: \par 10/06-11/04/22-MCOT 30 day: HR  bpm (63 bpm). 33% AF, longest 6:15 hours. 2% PACs. 6 second pause. S/P PPM.

## 2023-01-09 NOTE — ASSESSMENT
[FreeTextEntry1] : Lightheadedness: Pt with pAF, currently in AF. Pt with 6s pauses. Likely cause of symptoms. S/P PPM\par -Continue to follow with EP for PPM checks. \par \par AF: Rate controlled. [id number]=3\par -Continue with eliquis 5mg PO BID.\par -Currently on borderline for dose adjustment with Age >80 and weight ~60 kg. \par -Discussed with patient risk of lowering dose with weight on border, given hx of TIA, could underdose and increase stroke risk.\par -No bleeding history. \par -Discussed with patient to monitor weight closely and check for any signs of bleeding.\par -If patient continues to lose weight, will consider decreasing dose to 2.5mg PO daily.\par -Continue with metoprolol tartrate 25mg PO BID.\par -If continued palpitations, will increase as needed. \par \par PND/Orthopnea: Pt is euvolemic on exam. May be due to tachycardia overnight?\par -Check labs, including BNP.\par -Check TTE, urgent due to pt leaving for [place] mid February.  \par \par HTN: BP at goal per ACC/AHA 2018 guidelines\par -Continue with metoprolol tartrate 25mg PO BID. \par \par HLD: Need labs\par -Continue with lovastatin 20mg PO and fenofibrate 54mg PO daily. \par -Check labs. \par \par Follow up in 6 weeks.

## 2023-01-09 NOTE — HISTORY OF PRESENT ILLNESS
[FreeTextEntry1] : Ms. Samaniego is an 88yo F with PMHx of HTN, HLD, AF, TIA (many years ago) who presents to establish care. Her PMD is Dr. Igor Bass and previously followed with Dr. Luis Espino. Patient was admitted to [facility] in January 2022 for syncope work up. She has been feeling lightheaded at times along with SOB. She feels like she needs to take a deep breath to catch her breath. Lightheadedness has been occurring a few times a day, a couple days a week. She denies syncope but has had near syncope. \par -Patient found to have 6s pause on MCOT. Sent to [facility] for PPM placement. She underwent dual chamber [facility] PPM on 10/11/22. She had CP post procedure and was being treated for possible pericarditis. She completed colchicine. She had an episode of CP the other morning after burping. It lasted for a few minutes, then subsided. Denies further pain. \par -She has been having some orthopnea/PND over the last few weeks. SOB will only last a few seconds before she catches her breathe. Denies other exertional symptoms, LE edema. She recently saw EP and was told her AF burden was high and increased metoprolol.

## 2023-01-09 NOTE — REVIEW OF SYSTEMS
[Orthopnea] : orthopnea [PND] : PND [SOB] : no shortness of breath [Dyspnea on exertion] : not dyspnea during exertion [de-identified] : lightheadedness

## 2023-01-10 LAB
ALBUMIN SERPL ELPH-MCNC: 4.2 G/DL
ALP BLD-CCNC: 72 U/L
ALT SERPL-CCNC: 10 U/L
ANION GAP SERPL CALC-SCNC: 16 MMOL/L
AST SERPL-CCNC: 18 U/L
BASOPHILS # BLD AUTO: 0.05 K/UL
BASOPHILS NFR BLD AUTO: 0.8 %
BILIRUB SERPL-MCNC: 0.3 MG/DL
BUN SERPL-MCNC: 21 MG/DL
CALCIUM SERPL-MCNC: 10.1 MG/DL
CHLORIDE SERPL-SCNC: 103 MMOL/L
CHOLEST SERPL-MCNC: 154 MG/DL
CO2 SERPL-SCNC: 20 MMOL/L
CREAT SERPL-MCNC: 0.8 MG/DL
EGFR: 72 ML/MIN/1.73M2
EOSINOPHIL # BLD AUTO: 0.11 K/UL
EOSINOPHIL NFR BLD AUTO: 1.7 %
ESTIMATED AVERAGE GLUCOSE: 111 MG/DL
GLUCOSE SERPL-MCNC: 97 MG/DL
HBA1C MFR BLD HPLC: 5.5 %
HCT VFR BLD CALC: 41.6 %
HDLC SERPL-MCNC: 45 MG/DL
HGB BLD-MCNC: 13.7 G/DL
IMM GRANULOCYTES NFR BLD AUTO: 0.3 %
LDLC SERPL CALC-MCNC: 90 MG/DL
LYMPHOCYTES # BLD AUTO: 2.54 K/UL
LYMPHOCYTES NFR BLD AUTO: 38.5 %
MAN DIFF?: NORMAL
MCHC RBC-ENTMCNC: 29.5 PG
MCHC RBC-ENTMCNC: 32.9 G/DL
MCV RBC AUTO: 89.7 FL
MONOCYTES # BLD AUTO: 0.46 K/UL
MONOCYTES NFR BLD AUTO: 7 %
NEUTROPHILS # BLD AUTO: 3.42 K/UL
NEUTROPHILS NFR BLD AUTO: 51.7 %
NONHDLC SERPL-MCNC: 109 MG/DL
NT-PROBNP SERPL-MCNC: 1023 PG/ML
PLATELET # BLD AUTO: 326 K/UL
POTASSIUM SERPL-SCNC: 5.1 MMOL/L
PROT SERPL-MCNC: 6.8 G/DL
RBC # BLD: 4.64 M/UL
RBC # FLD: 13.3 %
SODIUM SERPL-SCNC: 139 MMOL/L
TRIGL SERPL-MCNC: 94 MG/DL
TSH SERPL-ACNC: 1.06 UIU/ML
WBC # FLD AUTO: 6.6 K/UL

## 2023-01-25 NOTE — ASSESSMENT
[FreeTextEntry1] : ## paroxysmal atrial fibrillation \par ## Tachy-erica syndrome s/p DC-PPM (SJM, 22, Non-dep)\par \par - PPM interrogation shows normally functioning DC-PPM. Battery life ok. No new events. \par - - On Eliquis. Compliant. No bleeding issues. Patient to contact us if there is any bleeding issues, interruption or any issues with OAC. Patient to go to ER/call 911 if any major bleeding. Age > 80, weight ~ 60 kg. We had detailed discussion (and w/ Dr. Bryant at separate conversation). We decided to continue with 5 mg PO q12h. If further significant weight loss, we will reduce dose to 2.5 mg q12h\par - Continue with Metoprolol\par - I discussed the risks, benefits and alternatives for a cardioversion. I reported a risk of major complications at 1% and minor complications at 4%. The details of the procedure and risks associated with undergoing the procedure were discussed in detail including but not limited to, death, myocardial ischemia, stroke, esophageal or stomach perforation, injury to teeth/pharynx, bleeding, infection, deep vein thrombosis, and worsening arrhythmias. All questions were answered to satisfaction and the patient expressed verbal understanding of the procedure, the benefits, and risks.\par \par - Wants to think about it. \par \par - Remote Monitoring \par - Return in 3 months

## 2023-01-25 NOTE — HISTORY OF PRESENT ILLNESS
[de-identified] : Ms. BENAVIDES is a 87 year-year old female with history of HTN, DL, paroxysmal atrial fibrillation, Tachy-erica syndrome s/p DC-PPM (SJM, 22, Non-dep), TIA is here for Atrial Fibrillation.\par \par + Syncope/Dizziness- associated with conversion pauses. No episodes since PPM.\par \par 1/5/23: + Fatigue\par \par Denies chest pain, shortness of breath, palpitation, dizziness or LOC except noted above.\par \par EKG (1/5/23): AF 80\par EKG (10/20/22): AF 81\par TTE (10/22): Nl EF, Mod LAE\par Cardio: Dr. Bryant\par

## 2023-01-27 DIAGNOSIS — R06.01 ORTHOPNEA: ICD-10-CM

## 2023-01-31 ENCOUNTER — OUTPATIENT (OUTPATIENT)
Dept: OUTPATIENT SERVICES | Facility: HOSPITAL | Age: 88
LOS: 1 days | Discharge: HOME | End: 2023-01-31
Payer: MEDICARE

## 2023-01-31 DIAGNOSIS — E78.5 HYPERLIPIDEMIA, UNSPECIFIED: ICD-10-CM

## 2023-01-31 DIAGNOSIS — Z78.9 OTHER SPECIFIED HEALTH STATUS: Chronic | ICD-10-CM

## 2023-01-31 DIAGNOSIS — I10 ESSENTIAL (PRIMARY) HYPERTENSION: ICD-10-CM

## 2023-01-31 DIAGNOSIS — I48.0 PAROXYSMAL ATRIAL FIBRILLATION: ICD-10-CM

## 2023-01-31 DIAGNOSIS — G45.9 TRANSIENT CEREBRAL ISCHEMIC ATTACK, UNSPECIFIED: ICD-10-CM

## 2023-01-31 DIAGNOSIS — I49.5 SICK SINUS SYNDROME: ICD-10-CM

## 2023-01-31 PROCEDURE — 93306 TTE W/DOPPLER COMPLETE: CPT | Mod: 26

## 2023-02-07 ENCOUNTER — APPOINTMENT (OUTPATIENT)
Dept: CARDIOLOGY | Facility: CLINIC | Age: 88
End: 2023-02-07
Payer: MEDICARE

## 2023-02-07 ENCOUNTER — NON-APPOINTMENT (OUTPATIENT)
Age: 88
End: 2023-02-07

## 2023-02-07 VITALS
WEIGHT: 132 LBS | SYSTOLIC BLOOD PRESSURE: 118 MMHG | DIASTOLIC BLOOD PRESSURE: 70 MMHG | HEIGHT: 62 IN | BODY MASS INDEX: 24.29 KG/M2

## 2023-02-07 DIAGNOSIS — Z87.898 PERSONAL HISTORY OF OTHER SPECIFIED CONDITIONS: ICD-10-CM

## 2023-02-07 PROCEDURE — 99213 OFFICE O/P EST LOW 20 MIN: CPT

## 2023-02-07 NOTE — PHYSICAL EXAM
[Well Developed] : well developed [Well Nourished] : well nourished [No Acute Distress] : no acute distress [Normal Conjunctiva] : normal conjunctiva [Normal Venous Pressure] : normal venous pressure [5th Left ICS - MCL] : palpated at the 5th LICS in the midclavicular line [Normal] : normal [No Precordial Heave] : no precordial heave was noted [Normal Rate] : normal [Irregularly Irregular] : irregularly irregular [Normal S1] : normal S1 [Normal S2] : normal S2 [No Murmur] : no murmurs heard [No Pitting Edema] : no pitting edema present [2+] : left 2+ [Clear Lung Fields] : clear lung fields [Good Air Entry] : good air entry [No Respiratory Distress] : no respiratory distress  [Soft] : abdomen soft [Non Tender] : non-tender [Normal Bowel Sounds] : normal bowel sounds [Normal Gait] : normal gait [No Edema] : no edema [No Varicosities] : no varicosities [No Rash] : no rash [Moves all extremities] : moves all extremities [No Focal Deficits] : no focal deficits [Alert and Oriented] : alert and oriented [de-identified] : Right upper chest wall incision, well healing.

## 2023-02-07 NOTE — HISTORY OF PRESENT ILLNESS
[FreeTextEntry1] : Ms. Samaniego is an 86yo F with PMHx of HTN, HLD, AF, TIA (many years ago) who presents to establish care. Her PMD is Dr. Igor Bass and previously followed with Dr. Luis Espino. Patient was admitted to [facility] in January 2022 for syncope work up. She has been feeling lightheaded at times along with SOB. She feels like she needs to take a deep breath to catch her breath. Lightheadedness has been occurring a few times a day, a couple days a week. She denies syncope but has had near syncope. \par -Patient found to have 6s pause on MCOT. Sent to [facility] for PPM placement. She underwent dual chamber [facility] PPM on 10/11/22. She had CP post procedure and was being treated for possible pericarditis. She completed colchicine. She had an episode of CP the other morning after burping. It lasted for a few minutes, then subsided. Denies further pain. \par -She has been having some orthopnea/PND over the last few weeks. SOB will only last a few seconds before she catches her breathe. Denies other exertional symptoms, LE edema. She recently saw EP and was told her AF burden was high and increased metoprolol. \par 02/07/23-Patient is feeling much better. Denies any further orthopnea or PND. Repeat TTE essentially unchanged.

## 2023-02-07 NOTE — REASON FOR VISIT
[Other: ____] : [unfilled] [FreeTextEntry1] : Diagnostic Tests:\par ---------------------\par EKG: \par 01/09/23-AF with paced beat at 76 bpm. Low voltage, precordial leads. \par 10/20/22-AF with PVCs vs aberrant conduction. Low voltage, precordial leads. \par 10/06/22-AF. LAD. \par 11/23/16-NSR. Low voltage, precordial leads. \par ---------------------\par Echo:\par 01/31/23-TTE: EF 64%. Aortic sclerosis. 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 Mild TR. \par 10/11/22-TTE: EF 56%. Mild MV thickening. Mild MR, TR. PASP 35 mmHg, borderline pHTN. Mild aortic root calcification. \par 01/05/22-TTE: EF 60-65%. Aortic sclerosis. IAS aneurysm. Mild-mod TR. PASP 43.8 mmHg. \par ---------------------\par US:\par 01/04/22-Carotid: 20-39% B/L ICA stenosis. \par 11/30/16-Carotid: 20-39% B/L ICA stenosis. \par ---------------------\par Holter: \par 10/06-11/04/22-MCOT 30 day: HR  bpm (63 bpm). 33% AF, longest 6:15 hours. 2% PACs. 6 second pause. S/P PPM.

## 2023-02-07 NOTE — REVIEW OF SYSTEMS
[Negative] : Heme/Lymph [SOB] : no shortness of breath [Dyspnea on exertion] : not dyspnea during exertion [Orthopnea] : no orthopnea [PND] : no PND [Dizziness] : no dizziness

## 2023-02-07 NOTE — ASSESSMENT
[FreeTextEntry1] : Lightheadedness: Pt with pAF, currently in AF. Pt with 6s pauses. Likely cause of symptoms. S/P PPM\par -Continue to follow with EP for PPM checks. \par \par AF: Rate controlled. [id number]\par -Continue with eliquis 5mg PO BID.\par -Currently on borderline for dose adjustment with Age >80 and weight ~60 kg. \par -Discussed with patient risk of lowering dose with weight on border, given hx of TIA, could underdose and increase stroke risk.\par -No bleeding history. \par -Discussed with patient to monitor weight closely and check for any signs of bleeding.\par -If patient continues to lose weight, will consider decreasing dose to 2.5mg PO daily.\par -Continue with metoprolol tartrate 50 mg PO BID. \par -If continued palpitations, will increase as needed. \par \par PND/Orthopnea: Pt is euvolemic on exam. May be due to tachycardia overnight?\par -Now resolved. \par -TTE normal with mildly elevated BNP. \par \par \par HTN: BP at goal per ACC/AHA 2018 guidelines\par -Continue with metoprolol tartrate 50mg PO BID. \par \par HLD: , TG 94, HDL 45, LDL 90\par -Continue with lovastatin 20mg PO and fenofibrate 54mg PO daily. \par -Check labs. \par \par Follow up in 3 months

## 2023-02-11 ENCOUNTER — EMERGENCY (EMERGENCY)
Facility: HOSPITAL | Age: 88
LOS: 0 days | Discharge: HOME | End: 2023-02-11
Attending: EMERGENCY MEDICINE
Payer: MEDICARE

## 2023-02-11 VITALS
OXYGEN SATURATION: 99 % | WEIGHT: 130.07 LBS | RESPIRATION RATE: 18 BRPM | DIASTOLIC BLOOD PRESSURE: 74 MMHG | HEART RATE: 101 BPM | SYSTOLIC BLOOD PRESSURE: 160 MMHG | HEIGHT: 66 IN

## 2023-02-11 VITALS
DIASTOLIC BLOOD PRESSURE: 62 MMHG | TEMPERATURE: 98 F | RESPIRATION RATE: 18 BRPM | HEART RATE: 97 BPM | SYSTOLIC BLOOD PRESSURE: 119 MMHG | OXYGEN SATURATION: 99 %

## 2023-02-11 DIAGNOSIS — U07.1 COVID-19: ICD-10-CM

## 2023-02-11 DIAGNOSIS — Z78.9 OTHER SPECIFIED HEALTH STATUS: Chronic | ICD-10-CM

## 2023-02-11 DIAGNOSIS — Z95.0 PRESENCE OF CARDIAC PACEMAKER: ICD-10-CM

## 2023-02-11 DIAGNOSIS — Z95.0 PRESENCE OF CARDIAC PACEMAKER: Chronic | ICD-10-CM

## 2023-02-11 DIAGNOSIS — Z88.0 ALLERGY STATUS TO PENICILLIN: ICD-10-CM

## 2023-02-11 DIAGNOSIS — Z91.018 ALLERGY TO OTHER FOODS: ICD-10-CM

## 2023-02-11 DIAGNOSIS — I48.91 UNSPECIFIED ATRIAL FIBRILLATION: ICD-10-CM

## 2023-02-11 DIAGNOSIS — Z79.01 LONG TERM (CURRENT) USE OF ANTICOAGULANTS: ICD-10-CM

## 2023-02-11 DIAGNOSIS — R07.89 OTHER CHEST PAIN: ICD-10-CM

## 2023-02-11 LAB
ALBUMIN SERPL ELPH-MCNC: 4.6 G/DL — SIGNIFICANT CHANGE UP (ref 3.5–5.2)
ALP SERPL-CCNC: 67 U/L — SIGNIFICANT CHANGE UP (ref 30–115)
ALT FLD-CCNC: 11 U/L — SIGNIFICANT CHANGE UP (ref 0–41)
ANION GAP SERPL CALC-SCNC: 13 MMOL/L — SIGNIFICANT CHANGE UP (ref 7–14)
APTT BLD: 31.7 SEC — SIGNIFICANT CHANGE UP (ref 27–39.2)
AST SERPL-CCNC: 21 U/L — SIGNIFICANT CHANGE UP (ref 0–41)
BASOPHILS # BLD AUTO: 0.04 K/UL — SIGNIFICANT CHANGE UP (ref 0–0.2)
BASOPHILS NFR BLD AUTO: 0.4 % — SIGNIFICANT CHANGE UP (ref 0–1)
BILIRUB SERPL-MCNC: 0.8 MG/DL — SIGNIFICANT CHANGE UP (ref 0.2–1.2)
BUN SERPL-MCNC: 18 MG/DL — SIGNIFICANT CHANGE UP (ref 10–20)
CALCIUM SERPL-MCNC: 9.4 MG/DL — SIGNIFICANT CHANGE UP (ref 8.4–10.5)
CHLORIDE SERPL-SCNC: 100 MMOL/L — SIGNIFICANT CHANGE UP (ref 98–110)
CO2 SERPL-SCNC: 23 MMOL/L — SIGNIFICANT CHANGE UP (ref 17–32)
CREAT SERPL-MCNC: 0.8 MG/DL — SIGNIFICANT CHANGE UP (ref 0.7–1.5)
D DIMER BLD IA.RAPID-MCNC: 243 NG/ML DDU — HIGH
EGFR: 72 ML/MIN/1.73M2 — SIGNIFICANT CHANGE UP
EOSINOPHIL # BLD AUTO: 0.06 K/UL — SIGNIFICANT CHANGE UP (ref 0–0.7)
EOSINOPHIL NFR BLD AUTO: 0.7 % — SIGNIFICANT CHANGE UP (ref 0–8)
FLUAV AG NPH QL: SIGNIFICANT CHANGE UP
FLUBV AG NPH QL: SIGNIFICANT CHANGE UP
GLUCOSE SERPL-MCNC: 115 MG/DL — HIGH (ref 70–99)
HCT VFR BLD CALC: 42.7 % — SIGNIFICANT CHANGE UP (ref 37–47)
HGB BLD-MCNC: 14.1 G/DL — SIGNIFICANT CHANGE UP (ref 12–16)
IMM GRANULOCYTES NFR BLD AUTO: 0.2 % — SIGNIFICANT CHANGE UP (ref 0.1–0.3)
INR BLD: 1.2 RATIO — SIGNIFICANT CHANGE UP (ref 0.65–1.3)
LYMPHOCYTES # BLD AUTO: 2.05 K/UL — SIGNIFICANT CHANGE UP (ref 1.2–3.4)
LYMPHOCYTES # BLD AUTO: 22.3 % — SIGNIFICANT CHANGE UP (ref 20.5–51.1)
MCHC RBC-ENTMCNC: 28.9 PG — SIGNIFICANT CHANGE UP (ref 27–31)
MCHC RBC-ENTMCNC: 33 G/DL — SIGNIFICANT CHANGE UP (ref 32–37)
MCV RBC AUTO: 87.5 FL — SIGNIFICANT CHANGE UP (ref 81–99)
MONOCYTES # BLD AUTO: 0.65 K/UL — HIGH (ref 0.1–0.6)
MONOCYTES NFR BLD AUTO: 7.1 % — SIGNIFICANT CHANGE UP (ref 1.7–9.3)
NEUTROPHILS # BLD AUTO: 6.38 K/UL — SIGNIFICANT CHANGE UP (ref 1.4–6.5)
NEUTROPHILS NFR BLD AUTO: 69.3 % — SIGNIFICANT CHANGE UP (ref 42.2–75.2)
NRBC # BLD: 0 /100 WBCS — SIGNIFICANT CHANGE UP (ref 0–0)
PLATELET # BLD AUTO: 202 K/UL — SIGNIFICANT CHANGE UP (ref 130–400)
POTASSIUM SERPL-MCNC: 4.5 MMOL/L — SIGNIFICANT CHANGE UP (ref 3.5–5)
POTASSIUM SERPL-SCNC: 4.5 MMOL/L — SIGNIFICANT CHANGE UP (ref 3.5–5)
PROT SERPL-MCNC: 6.8 G/DL — SIGNIFICANT CHANGE UP (ref 6–8)
PROTHROM AB SERPL-ACNC: 13.8 SEC — HIGH (ref 9.95–12.87)
RBC # BLD: 4.88 M/UL — SIGNIFICANT CHANGE UP (ref 4.2–5.4)
RBC # FLD: 13.9 % — SIGNIFICANT CHANGE UP (ref 11.5–14.5)
RSV RNA NPH QL NAA+NON-PROBE: SIGNIFICANT CHANGE UP
SARS-COV-2 RNA SPEC QL NAA+PROBE: DETECTED
SODIUM SERPL-SCNC: 136 MMOL/L — SIGNIFICANT CHANGE UP (ref 135–146)
TROPONIN T SERPL-MCNC: <0.01 NG/ML — SIGNIFICANT CHANGE UP
WBC # BLD: 9.2 K/UL — SIGNIFICANT CHANGE UP (ref 4.8–10.8)
WBC # FLD AUTO: 9.2 K/UL — SIGNIFICANT CHANGE UP (ref 4.8–10.8)

## 2023-02-11 PROCEDURE — 93010 ELECTROCARDIOGRAM REPORT: CPT

## 2023-02-11 PROCEDURE — 71275 CT ANGIOGRAPHY CHEST: CPT | Mod: 26,MA

## 2023-02-11 PROCEDURE — 85025 COMPLETE CBC W/AUTO DIFF WBC: CPT

## 2023-02-11 PROCEDURE — 99285 EMERGENCY DEPT VISIT HI MDM: CPT | Mod: CS

## 2023-02-11 PROCEDURE — 93005 ELECTROCARDIOGRAM TRACING: CPT

## 2023-02-11 PROCEDURE — 85730 THROMBOPLASTIN TIME PARTIAL: CPT

## 2023-02-11 PROCEDURE — 80053 COMPREHEN METABOLIC PANEL: CPT

## 2023-02-11 PROCEDURE — 84484 ASSAY OF TROPONIN QUANT: CPT

## 2023-02-11 PROCEDURE — 36415 COLL VENOUS BLD VENIPUNCTURE: CPT

## 2023-02-11 PROCEDURE — 85379 FIBRIN DEGRADATION QUANT: CPT

## 2023-02-11 PROCEDURE — 71046 X-RAY EXAM CHEST 2 VIEWS: CPT | Mod: 26

## 2023-02-11 PROCEDURE — 0241U: CPT

## 2023-02-11 PROCEDURE — 99285 EMERGENCY DEPT VISIT HI MDM: CPT | Mod: 25

## 2023-02-11 PROCEDURE — 71046 X-RAY EXAM CHEST 2 VIEWS: CPT

## 2023-02-11 PROCEDURE — 71275 CT ANGIOGRAPHY CHEST: CPT | Mod: MA

## 2023-02-11 PROCEDURE — 85610 PROTHROMBIN TIME: CPT

## 2023-02-11 RX ORDER — FENOFIBRATE,MICRONIZED 130 MG
1 CAPSULE ORAL
Qty: 0 | Refills: 0 | DISCHARGE

## 2023-02-11 RX ORDER — APIXABAN 2.5 MG/1
1 TABLET, FILM COATED ORAL
Qty: 0 | Refills: 0 | DISCHARGE

## 2023-02-11 RX ORDER — LOVASTATIN 20 MG
1 TABLET ORAL
Qty: 0 | Refills: 0 | DISCHARGE

## 2023-02-11 RX ORDER — METOPROLOL TARTRATE 50 MG
1 TABLET ORAL
Qty: 0 | Refills: 0 | DISCHARGE

## 2023-02-11 RX ORDER — ASPIRIN/CALCIUM CARB/MAGNESIUM 324 MG
1 TABLET ORAL
Qty: 0 | Refills: 0 | DISCHARGE

## 2023-02-11 RX ORDER — ACETAMINOPHEN 500 MG
975 TABLET ORAL ONCE
Refills: 0 | Status: COMPLETED | OUTPATIENT
Start: 2023-02-11 | End: 2023-02-11

## 2023-02-11 RX ORDER — MIRABEGRON 50 MG/1
1 TABLET, EXTENDED RELEASE ORAL
Qty: 0 | Refills: 0 | DISCHARGE

## 2023-02-11 NOTE — ED PROVIDER NOTE - PATIENT PORTAL LINK FT
You can access the FollowMyHealth Patient Portal offered by [facility] by registering at the following website: [email or web address]. By joining [facility]’s FollowMyHealth portal, you will also be able to view your health information using other applications (apps) compatible with our system.

## 2023-02-11 NOTE — ED ADULT NURSE NOTE - WILL THE PATIENT ACCEPT THE PFIZER COVID-19 VACCINE IF ELIGIBLE AND IT IS AVAILABLE?
Message   Recorded as Task   Date: 05/11/2017 02:41 PM, Created By: ALICIA CHAHAL   Task Name: Call Patient with results   Assigned To: ALICIA CHAHAL   Regarding Patient: NIKITA NICHOLSON, Status: In Progress   Comment:    ALICIA CHAHAL - 11 May 2017 2:41 PM     Patient Phone: [phone]   Demetris Cuellar - 11 May 2017 4:55 PM     TASK EDITED  left message for family to return office phone call to discuss bone age   Demetris Cuellar - 12 May 2017 9:19 AM     TASK IN PROGRESS   Demetris Cuellar - 12 May 2017 9:19 AM     TASK EDITED  called lab results to mom, she verbalized understanding.     Signatures   Electronically signed by : ALICIA CHAHAL M.D.; May 12 2017  1:01PM CST     No

## 2023-02-11 NOTE — ED PROVIDER NOTE - OBJECTIVE STATEMENT
87-year-old female history of pericarditis, A-fib on Eliquis, pacemaker complaining of chest pain with breathing since 12:30 AM last night.  Patient states that it woke her up from sleep.  Pain is constant but is worse when she takes a breath.  No fevers, shortness of breath, cough, nausea/vomiting, or abdominal pains.

## 2023-02-11 NOTE — ED PROVIDER NOTE - NSFOLLOWUPINSTRUCTIONS_ED_ALL_ED_FT
You have musculoskeletal chest wall pain.     Return for any shortness of breath or any concerning symptoms.

## 2023-02-11 NOTE — ED PROVIDER NOTE - HIV OFFER
Follow up with your doctor  Return with more pain, swelling, or difficulty with urination  Motrin or tylenol for pain  
Opt out

## 2023-02-11 NOTE — ED PROVIDER NOTE - PHYSICAL EXAMINATION
Gen: Alert, NAD, well appearing  Head: NC, AT, PERRL, EOMI, normal lids/conjunctiva  ENT: normal hearing  Neck: +supple, no tenderness/meningismus,  Pulm: Bilateral BS, normal resp effort, no wheeze/stridor/retractions  CV: S1S2, irregular  Abd: soft, NT/ND  Mskel: no edema/erythema/cyanosis  Skin: no rash, warm/dry  Neuro: AAOx3, no sensory/motor deficits

## 2023-02-11 NOTE — ED PROVIDER NOTE - ATTENDING APP SHARED VISIT CONTRIBUTION OF CARE
Pt presents with chest pain that started at 12:30 am while resting. NO associated nausea, vomiting, shortness of breath. + radiation to the left arm. Hx of pericarditis diagnosed in October and was treated with colchicine. Hx of atrial fibrillation on Eliquis and metoprolol. Sees DR. Freeman for cardiology. No leg pain. On exam no distress. S1S2 irreg, lungs clear, no abdominal tenderness, no rash, + anterior chest wall tenderness. Pt presents with chest pain that started at 12:30 am while resting. NO associated nausea, vomiting, shortness of breath. + radiation to the left arm. Hx of pericarditis diagnosed in October and was treated with colchicine. Hx of atrial fibrillation on Eliquis and metoprolol. Sees DR. Freeman for cardiology. No leg pain. On exam no distress. S1S2 irreg, lungs clear, no abdominal tenderness, no rash, + anterior chest wall tenderness. Pt reports she had COVID 3 week ago and had congestion then.

## 2023-02-11 NOTE — ED PROVIDER NOTE - CLINICAL SUMMARY MEDICAL DECISION MAKING FREE TEXT BOX
Pt has COVID three weeks ago. Now with reproducible tenderness to the chest. PT will not need treatment for covid. Tylenol prn. Continue meds.

## 2023-04-06 ENCOUNTER — NON-APPOINTMENT (OUTPATIENT)
Age: 88
End: 2023-04-06

## 2023-04-06 ENCOUNTER — APPOINTMENT (OUTPATIENT)
Dept: CARDIOLOGY | Facility: CLINIC | Age: 88
End: 2023-04-06
Payer: MEDICARE

## 2023-04-06 PROCEDURE — 93296 REM INTERROG EVL PM/IDS: CPT

## 2023-04-06 PROCEDURE — 93294 REM INTERROG EVL PM/LDLS PM: CPT

## 2023-05-08 ENCOUNTER — APPOINTMENT (OUTPATIENT)
Dept: CARDIOLOGY | Facility: CLINIC | Age: 88
End: 2023-05-08

## 2023-06-13 ENCOUNTER — APPOINTMENT (OUTPATIENT)
Dept: ORTHOPEDIC SURGERY | Facility: CLINIC | Age: 88
End: 2023-06-13

## 2023-07-06 ENCOUNTER — APPOINTMENT (OUTPATIENT)
Dept: ELECTROPHYSIOLOGY | Facility: CLINIC | Age: 88
End: 2023-07-06

## 2023-07-20 ENCOUNTER — APPOINTMENT (OUTPATIENT)
Dept: CARDIOLOGY | Facility: CLINIC | Age: 88
End: 2023-07-20
Payer: MEDICARE

## 2023-07-20 VITALS
SYSTOLIC BLOOD PRESSURE: 118 MMHG | HEIGHT: 63 IN | DIASTOLIC BLOOD PRESSURE: 70 MMHG | WEIGHT: 130 LBS | BODY MASS INDEX: 23.04 KG/M2

## 2023-07-20 PROCEDURE — 93000 ELECTROCARDIOGRAM COMPLETE: CPT

## 2023-07-20 PROCEDURE — 99213 OFFICE O/P EST LOW 20 MIN: CPT

## 2023-07-20 RX ORDER — APIXABAN 5 MG/1
5 TABLET, FILM COATED ORAL
Qty: 180 | Refills: 3 | Status: DISCONTINUED | COMMUNITY
End: 2023-07-20

## 2023-07-20 RX ORDER — METOPROLOL TARTRATE 50 MG/1
50 TABLET, FILM COATED ORAL TWICE DAILY
Refills: 0 | Status: DISCONTINUED | COMMUNITY
End: 2023-07-20

## 2023-07-20 NOTE — ASSESSMENT
[FreeTextEntry1] : Lightheadedness: Pt with pAF, currently in AF. Pt with 6s pauses. Likely cause of symptoms. S/P PPM\par -Continue to follow with EP for PPM checks. \par \par AF: Rate controlled. [id number]\par -Continue with eliquis 2.5mg PO BID and metoprolol succinate 100mg PO daily.  \par -If continued palpitations, will increase as needed. \par \par PND/Orthopnea: Pt is euvolemic on exam. May be due to tachycardia overnight?\par -Now resolved. \par -TTE normal with mildly elevated BNP. \par \par HTN: BP at goal per ACC/AHA 2018 guidelines\par -Continue with metoprolol succinate 100mg PO daily. \par \par HLD: , TG 94, HDL 45, LDL 90\par -Continue with lovastatin 20mg PO and fenofibrate 54mg PO daily. \par -Check labs. \par \par Follow up in 4 months

## 2023-07-20 NOTE — PHYSICAL EXAM
[Well Developed] : well developed [Well Nourished] : well nourished [No Acute Distress] : no acute distress [Normal Conjunctiva] : normal conjunctiva [Normal Venous Pressure] : normal venous pressure [5th Left ICS - MCL] : palpated at the 5th LICS in the midclavicular line [Normal] : normal [No Precordial Heave] : no precordial heave was noted [Normal Rate] : normal [Irregularly Irregular] : irregularly irregular [Normal S1] : normal S1 [Normal S2] : normal S2 [No Murmur] : no murmurs heard [No Pitting Edema] : no pitting edema present [2+] : left 2+ [Clear Lung Fields] : clear lung fields [Good Air Entry] : good air entry [No Respiratory Distress] : no respiratory distress  [Soft] : abdomen soft [Non Tender] : non-tender [Normal Bowel Sounds] : normal bowel sounds [Normal Gait] : normal gait [No Edema] : no edema [No Varicosities] : no varicosities [No Rash] : no rash [Moves all extremities] : moves all extremities [No Focal Deficits] : no focal deficits [Alert and Oriented] : alert and oriented [de-identified] : Right upper chest wall incision, well healing.

## 2023-07-20 NOTE — REASON FOR VISIT
[Other: ____] : [unfilled] [FreeTextEntry1] : Diagnostic Tests:\par ---------------------\par EKG: \par 07/20/23-Atrial paced rhythm. LAD. rSr' in V1-2. \par 01/09/23-AF with paced beat at 76 bpm. Low voltage, precordial leads. \par 10/20/22-AF with PVCs vs aberrant conduction. Low voltage, precordial leads. \par 10/06/22-AF. LAD. \par 11/23/16-NSR. Low voltage, precordial leads. \par ---------------------\par Echo:\par 01/31/23-TTE: EF 64%. Aortic sclerosis. 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 Mild TR. \par 10/11/22-TTE: EF 56%. Mild MV thickening. Mild MR, TR. PASP 35 mmHg, borderline pHTN. Mild aortic root calcification. \par 01/05/22-TTE: EF 60-65%. Aortic sclerosis. IAS aneurysm. Mild-mod TR. PASP 43.8 mmHg. \par ---------------------\par US:\par 01/04/22-Carotid: 20-39% B/L ICA stenosis. \par 11/30/16-Carotid: 20-39% B/L ICA stenosis. \par ---------------------\par Holter: \par 10/06-11/04/22-MCOT 30 day: HR  bpm (63 bpm). 33% AF, longest 6:15 hours. 2% PACs. 6 second pause. S/P PPM.

## 2023-07-20 NOTE — HISTORY OF PRESENT ILLNESS
[FreeTextEntry1] : Ms. Samaniego is an 86yo F with PMHx of HTN, HLD, AF, TIA (many years ago) who presents to establish care. Her PMD is Dr. Igor Bass and previously followed with Dr. Luis Espino. Patient was admitted to [facility] in January 2022 for syncope work up. She has been feeling lightheaded at times along with SOB. She feels like she needs to take a deep breath to catch her breath. Lightheadedness has been occurring a few times a day, a couple days a week. She denies syncope but has had near syncope. \par -Patient found to have 6s pause on MCOT. Sent to [facility] for PPM placement. She underwent dual chamber [facility] PPM on 10/11/22. She had CP post procedure and was being treated for possible pericarditis. She completed colchicine. She had an episode of CP the other morning after burping. It lasted for a few minutes, then subsided. Denies further pain. \par -She has been having some orthopnea/PND over the last few weeks. SOB will only last a few seconds before she catches her breathe. Denies other exertional symptoms, LE edema. She recently saw EP and was told her AF burden was high and increased metoprolol. \par 02/07/23-Patient is feeling much better. Denies any further orthopnea or PND. Repeat TTE essentially unchanged. \par 07/20/23-Patient was having hypotension and her cardiologist in [place] told her to have salty food. She gets some LE swelling which remits in the morning. She still gets occasional palpitations. Had Eliquis lowered.

## 2023-08-02 ENCOUNTER — APPOINTMENT (OUTPATIENT)
Dept: ELECTROPHYSIOLOGY | Facility: CLINIC | Age: 88
End: 2023-08-02
Payer: MEDICARE

## 2023-08-02 VITALS
SYSTOLIC BLOOD PRESSURE: 126 MMHG | BODY MASS INDEX: 22.68 KG/M2 | DIASTOLIC BLOOD PRESSURE: 72 MMHG | HEIGHT: 63 IN | WEIGHT: 128 LBS | HEART RATE: 60 BPM

## 2023-08-02 PROCEDURE — 99214 OFFICE O/P EST MOD 30 MIN: CPT

## 2023-08-02 PROCEDURE — 93280 PM DEVICE PROGR EVAL DUAL: CPT

## 2023-08-02 PROCEDURE — 93000 ELECTROCARDIOGRAM COMPLETE: CPT | Mod: 59

## 2023-08-08 LAB
ALBUMIN SERPL ELPH-MCNC: 4.1 G/DL
ALP BLD-CCNC: 50 U/L
ALT SERPL-CCNC: 7 U/L
ANION GAP SERPL CALC-SCNC: 13 MMOL/L
AST SERPL-CCNC: 18 U/L
BILIRUB SERPL-MCNC: 0.5 MG/DL
BUN SERPL-MCNC: 29 MG/DL
CALCIUM SERPL-MCNC: 9.4 MG/DL
CHLORIDE SERPL-SCNC: 104 MMOL/L
CHOLEST SERPL-MCNC: 140 MG/DL
CO2 SERPL-SCNC: 21 MMOL/L
CREAT SERPL-MCNC: 1 MG/DL
EGFR: 55 ML/MIN/1.73M2
ESTIMATED AVERAGE GLUCOSE: 105 MG/DL
GLUCOSE SERPL-MCNC: 90 MG/DL
HBA1C MFR BLD HPLC: 5.3 %
HDLC SERPL-MCNC: 44 MG/DL
LDLC SERPL CALC-MCNC: 81 MG/DL
NONHDLC SERPL-MCNC: 96 MG/DL
POTASSIUM SERPL-SCNC: 4.4 MMOL/L
PROT SERPL-MCNC: 6 G/DL
SODIUM SERPL-SCNC: 138 MMOL/L
TRIGL SERPL-MCNC: 77 MG/DL
TSH SERPL-ACNC: 1.14 UIU/ML

## 2023-08-28 NOTE — ASSESSMENT
[FreeTextEntry1] : ## Paroxysmal atrial fibrillation  ## Tachy-erica syndrome s/p DC-PPM (SJM, 22, Non-dep)  - PPM interrogation shows normally functioning DC-PPM. Battery life ok. No new events.  - - On Eliquis. Compliant. No bleeding issues. Patient to contact us if there is any bleeding issues, interruption or any issues with OAC. Patient to go to ER/call 911 if any major bleeding. Age > 80, weight ~ 60 kg. We had detailed discussion (and w/ Dr. Bryant at separate conversation). We decided to continue with 5 mg PO q12h.However, her dose was lowered to 2.5 mg by her cardiologist in [place]. Discussed pros-cons with the patient, and we decided to continue at 2.5 mg dosing. - Continue with Metoprolol - Remote Monitoring  - Return in 4 months (before her visit to [place])

## 2023-08-28 NOTE — HISTORY OF PRESENT ILLNESS
[de-identified] : Ms. BENAVIDES is a 87 year-year old female with history of HTN, DL, paroxysmal atrial fibrillation, Tachy-erica syndrome s/p DC-PPM (SJM, 22, Non-dep), TIA is here for Atrial Fibrillation.  + Syncope/Dizziness- associated with conversion pauses. No episodes since PPM.  1/5/23: + Fatigue 8/2/23: Feels fine.   Denies chest pain, shortness of breath, palpitation, dizziness or LOC except noted above.  EKG (008/2/23): AP 60 EKG (1/5/23): AF 80 EKG (10/20/22): AF 81 TTE (10/22): Nl EF, Mod LAE Cardio: Dr. Bryant

## 2023-08-28 NOTE — PROCEDURE
[No] : not [Atrial Fibrillation] : atrial fibrillation [Pacemaker] : pacemaker [DDDR] : DDDR [Voltage: ___ volts] : Voltage was [unfilled] volts [Magnet Rate: ___ Ppm] : magnet rate was [unfilled] Ppm [Longevity: ___ months] : The estimated remaining battery life is [unfilled] months [Threshold Testing Performed] : Threshold testing was performed [Lead Imp:  ___ohms] : lead impedance was [unfilled] ohms [Sensing Amplitude ___mv] : sensing amplitude was [unfilled] mv [___V @] : [unfilled] V [___ ms] : [unfilled] ms [Programmed for Longevity] : output reprogrammed for improved battery longevity [Apace-Vpace ___ %] : Apace-Vpace [unfilled]% [Auto Capture "On"] : auto capture was switched "On" [de-identified] : [employer] [de-identified] : [id number] [de-identified] : [id number] [de-identified] : 10/11/2022 [de-identified] : 60 [de-identified] : Turned on AutoCapture in Both Chambers [de-identified] : In Sinus  Pt on Remote Monitoring

## 2023-09-29 DIAGNOSIS — R06.6 HICCOUGH: ICD-10-CM

## 2023-10-02 LAB
ALBUMIN SERPL ELPH-MCNC: 3.9 G/DL
ALP BLD-CCNC: 55 U/L
ALT SERPL-CCNC: 8 U/L
ANION GAP SERPL CALC-SCNC: 13 MMOL/L
AST SERPL-CCNC: 16 U/L
BASOPHILS # BLD AUTO: 0.03 K/UL
BASOPHILS NFR BLD AUTO: 0.6 %
BILIRUB SERPL-MCNC: 0.4 MG/DL
BUN SERPL-MCNC: 20 MG/DL
CALCIUM SERPL-MCNC: 9.4 MG/DL
CHLORIDE SERPL-SCNC: 103 MMOL/L
CO2 SERPL-SCNC: 24 MMOL/L
CREAT SERPL-MCNC: 1 MG/DL
CRP SERPL-MCNC: 5.6 MG/L
EGFR: 55 ML/MIN/1.73M2
EOSINOPHIL # BLD AUTO: 0.15 K/UL
EOSINOPHIL NFR BLD AUTO: 3 %
GLUCOSE SERPL-MCNC: 93 MG/DL
HCT VFR BLD CALC: 41.6 %
HGB BLD-MCNC: 13.2 G/DL
IMM GRANULOCYTES NFR BLD AUTO: 0.4 %
LYMPHOCYTES # BLD AUTO: 2.75 K/UL
LYMPHOCYTES NFR BLD AUTO: 54.9 %
MAN DIFF?: NORMAL
MCHC RBC-ENTMCNC: 30.1 PG
MCHC RBC-ENTMCNC: 31.7 G/DL
MCV RBC AUTO: 94.8 FL
MONOCYTES # BLD AUTO: 0.31 K/UL
MONOCYTES NFR BLD AUTO: 6.2 %
NEUTROPHILS # BLD AUTO: 1.75 K/UL
NEUTROPHILS NFR BLD AUTO: 34.9 %
NT-PROBNP SERPL-MCNC: 1357 PG/ML
PLATELET # BLD AUTO: 218 K/UL
POTASSIUM SERPL-SCNC: 4.4 MMOL/L
PROT SERPL-MCNC: 6.3 G/DL
RBC # BLD: 4.39 M/UL
RBC # FLD: 13.5 %
SODIUM SERPL-SCNC: 140 MMOL/L
WBC # FLD AUTO: 5.01 K/UL

## 2023-10-04 ENCOUNTER — APPOINTMENT (OUTPATIENT)
Dept: CARDIOLOGY | Facility: CLINIC | Age: 88
End: 2023-10-04
Payer: MEDICARE

## 2023-10-04 VITALS
SYSTOLIC BLOOD PRESSURE: 108 MMHG | HEART RATE: 71 BPM | BODY MASS INDEX: 23.04 KG/M2 | HEIGHT: 63 IN | DIASTOLIC BLOOD PRESSURE: 64 MMHG | WEIGHT: 130 LBS

## 2023-10-04 DIAGNOSIS — R23.2 FLUSHING: ICD-10-CM

## 2023-10-04 DIAGNOSIS — R61 FLUSHING: ICD-10-CM

## 2023-10-04 PROCEDURE — 99213 OFFICE O/P EST LOW 20 MIN: CPT

## 2023-10-04 PROCEDURE — 93000 ELECTROCARDIOGRAM COMPLETE: CPT

## 2023-10-24 ENCOUNTER — RESULT REVIEW (OUTPATIENT)
Age: 88
End: 2023-10-24

## 2023-10-24 ENCOUNTER — APPOINTMENT (OUTPATIENT)
Dept: CARDIOLOGY | Facility: CLINIC | Age: 88
End: 2023-10-24
Payer: MEDICARE

## 2023-10-24 ENCOUNTER — OUTPATIENT (OUTPATIENT)
Dept: OUTPATIENT SERVICES | Facility: HOSPITAL | Age: 88
LOS: 1 days | End: 2023-10-24
Payer: MEDICARE

## 2023-10-24 VITALS
SYSTOLIC BLOOD PRESSURE: 110 MMHG | OXYGEN SATURATION: 97 % | DIASTOLIC BLOOD PRESSURE: 70 MMHG | BODY MASS INDEX: 23.04 KG/M2 | HEART RATE: 66 BPM | WEIGHT: 130 LBS | HEIGHT: 63 IN

## 2023-10-24 DIAGNOSIS — I48.0 PAROXYSMAL ATRIAL FIBRILLATION: ICD-10-CM

## 2023-10-24 DIAGNOSIS — Z95.0 PRESENCE OF CARDIAC PACEMAKER: Chronic | ICD-10-CM

## 2023-10-24 DIAGNOSIS — G89.18 OTHER ACUTE POSTPROCEDURAL PAIN: ICD-10-CM

## 2023-10-24 DIAGNOSIS — Z78.9 OTHER SPECIFIED HEALTH STATUS: Chronic | ICD-10-CM

## 2023-10-24 DIAGNOSIS — E78.5 HYPERLIPIDEMIA, UNSPECIFIED: ICD-10-CM

## 2023-10-24 DIAGNOSIS — I49.5 SICK SINUS SYNDROME: ICD-10-CM

## 2023-10-24 PROCEDURE — 99213 OFFICE O/P EST LOW 20 MIN: CPT

## 2023-10-24 PROCEDURE — 93000 ELECTROCARDIOGRAM COMPLETE: CPT

## 2023-10-24 PROCEDURE — 71046 X-RAY EXAM CHEST 2 VIEWS: CPT | Mod: 26

## 2023-10-24 PROCEDURE — 71046 X-RAY EXAM CHEST 2 VIEWS: CPT

## 2023-10-25 DIAGNOSIS — I49.5 SICK SINUS SYNDROME: ICD-10-CM

## 2023-10-25 DIAGNOSIS — I48.0 PAROXYSMAL ATRIAL FIBRILLATION: ICD-10-CM

## 2023-11-01 ENCOUNTER — APPOINTMENT (OUTPATIENT)
Dept: CARDIOLOGY | Facility: CLINIC | Age: 88
End: 2023-11-01
Payer: MEDICARE

## 2023-11-01 ENCOUNTER — NON-APPOINTMENT (OUTPATIENT)
Age: 88
End: 2023-11-01

## 2023-11-01 PROCEDURE — 93296 REM INTERROG EVL PM/IDS: CPT

## 2023-11-01 PROCEDURE — 93294 REM INTERROG EVL PM/LDLS PM: CPT

## 2023-11-15 ENCOUNTER — APPOINTMENT (OUTPATIENT)
Dept: CARDIOLOGY | Facility: CLINIC | Age: 88
End: 2023-11-15

## 2023-11-21 NOTE — ED ADULT TRIAGE NOTE - MODE OF ARRIVAL
"Chief Complaint   Patient presents with    RECHECK     Pt here for Wilm's tumor LTFU      /85 (BP Location: Right arm, Patient Position: Sitting, Cuff Size: Adult Regular)   Pulse 101   Temp 98.2  F (36.8  C) (Oral)   Resp 16   Ht 1.494 m (4' 10.82\")   Wt 55 kg (121 lb 4.1 oz)   SpO2 99%   BMI 24.64 kg/m      No Pain (0)  Data Unavailable    I have reviewed the patients medications and allergies    Height/weight double check needed? No    Peds Outpatient BP  1) Rested for 5 minutes, BP taken on bare arm, patient sitting (or supine for infants) w/ legs uncrossed?   Yes  2) Right arm used?  Right arm   Yes  3) Arm circumference of largest part of upper arm (in cm): 29cm  4) BP cuff sized used: Adult (25-32cm)   If used different size cuff then what was recommended why? N/A  5) First BP reading:machine   BP Readings from Last 1 Encounters:   11/21/23 123/85      Is reading >90%?No   (90% for <1 years is 90/50)  (90% for >18 years is 140/90)  *If a machine BP is at or above 90% take manual BP  6) Manual BP reading: N/A  7) Other comments: None          Doug Sommer, EMT  November 21, 2023    " [employer]

## 2023-12-12 ENCOUNTER — APPOINTMENT (OUTPATIENT)
Dept: CARDIOLOGY | Facility: CLINIC | Age: 88
End: 2023-12-12
Payer: MEDICARE

## 2023-12-12 VITALS
WEIGHT: 126 LBS | HEART RATE: 65 BPM | SYSTOLIC BLOOD PRESSURE: 122 MMHG | BODY MASS INDEX: 22.32 KG/M2 | OXYGEN SATURATION: 98 % | HEIGHT: 63 IN | DIASTOLIC BLOOD PRESSURE: 60 MMHG

## 2023-12-12 DIAGNOSIS — I10 ESSENTIAL (PRIMARY) HYPERTENSION: ICD-10-CM

## 2023-12-12 DIAGNOSIS — R60.0 LOCALIZED EDEMA: ICD-10-CM

## 2023-12-12 DIAGNOSIS — I48.0 PAROXYSMAL ATRIAL FIBRILLATION: ICD-10-CM

## 2023-12-12 DIAGNOSIS — I49.5 SICK SINUS SYNDROME: ICD-10-CM

## 2023-12-12 DIAGNOSIS — G45.9 TRANSIENT CEREBRAL ISCHEMIC ATTACK, UNSPECIFIED: ICD-10-CM

## 2023-12-12 PROCEDURE — 99213 OFFICE O/P EST LOW 20 MIN: CPT

## 2023-12-28 ENCOUNTER — APPOINTMENT (OUTPATIENT)
Dept: ELECTROPHYSIOLOGY | Facility: CLINIC | Age: 88
End: 2023-12-28
Payer: MEDICARE

## 2023-12-28 VITALS
BODY MASS INDEX: 22.86 KG/M2 | RESPIRATION RATE: 18 BRPM | HEART RATE: 60 BPM | WEIGHT: 129 LBS | SYSTOLIC BLOOD PRESSURE: 118 MMHG | DIASTOLIC BLOOD PRESSURE: 66 MMHG | HEIGHT: 63 IN | TEMPERATURE: 97.1 F

## 2023-12-28 PROCEDURE — 93000 ELECTROCARDIOGRAM COMPLETE: CPT | Mod: 59

## 2023-12-28 PROCEDURE — 99214 OFFICE O/P EST MOD 30 MIN: CPT

## 2023-12-28 PROCEDURE — 93280 PM DEVICE PROGR EVAL DUAL: CPT

## 2023-12-28 RX ORDER — METOPROLOL SUCCINATE 100 MG/1
100 TABLET, EXTENDED RELEASE ORAL DAILY
Qty: 90 | Refills: 3 | Status: ACTIVE | COMMUNITY
Start: 2023-07-20

## 2023-12-28 RX ORDER — MIRABEGRON 50 MG/1
50 TABLET, FILM COATED, EXTENDED RELEASE ORAL
Refills: 0 | Status: ACTIVE | COMMUNITY

## 2023-12-28 RX ORDER — METOCLOPRAMIDE 5 MG/1
5 TABLET ORAL AS DIRECTED
Qty: 2 | Refills: 0 | Status: ACTIVE | COMMUNITY
Start: 2023-09-29

## 2023-12-28 RX ORDER — FENOFIBRATE 54 MG/1
54 TABLET ORAL DAILY
Qty: 90 | Refills: 3 | Status: ACTIVE | COMMUNITY

## 2023-12-28 RX ORDER — LOVASTATIN 20 MG/1
20 TABLET ORAL
Qty: 90 | Refills: 3 | Status: ACTIVE | COMMUNITY

## 2023-12-28 RX ORDER — APIXABAN 2.5 MG/1
2.5 TABLET, FILM COATED ORAL TWICE DAILY
Qty: 180 | Refills: 3 | Status: ACTIVE | COMMUNITY
Start: 2023-07-20

## 2023-12-28 RX ORDER — OMEGA-3/DHA/EPA/FISH OIL 300-1000MG
CAPSULE ORAL
Refills: 0 | Status: ACTIVE | COMMUNITY

## 2023-12-28 NOTE — HISTORY OF PRESENT ILLNESS
[de-identified] : Ms. BENAVIDES is a 87 year-year old female with history of HTN, DL, paroxysmal atrial fibrillation, Tachy-erica syndrome s/p DC-PPM (SJM, 22, Non-dep), TIA is here for Atrial Fibrillation.  + Syncope/Dizziness- associated with conversion pauses. No episodes since PPM.  1/5/23: + Fatigue 8/2/23: Feels fine.  12/28/2023: Feels fine. Flying to [place] next week. No complaints.    Denies chest pain, shortness of breath, palpitation, dizziness or LOC except noted above.  EKG (12/28/2023): AP @ 60, , QRS 84, QTc 396  EKG (008/2/23): AP 60 EKG (1/5/23): AF 80 EKG (10/20/22): AF 81 TTE (10/22): Nl EF, Mod LAE Cardio: Dr. Bryant

## 2023-12-28 NOTE — ASSESSMENT
[FreeTextEntry1] : ## Paroxysmal atrial fibrillation ## Tachy-erica syndrome s/p DC-PPM (SJM, 22, Non-dep)   - PPM interrogation shows normally functioning DC-PPM. Battery life ok. No new events. - - On Eliquis. Compliant. No bleeding issues. Patient to contact us if there is any bleeding issues, interruption or any issues with OAC. Patient to go to ER/call 911 if any major bleeding. Age > 80, weight ~ 60 kg. We had detailed discussion (and w/ Dr. Bryant at separate conversation). We decided to continue with 5 mg PO q12h.However, her dose was lowered to 2.5 mg by her cardiologist in [place]. Discussed pros-cons with the patient, and we decided to continue at 2.5 mg dosing. - Continue with Metoprolol - Remote Monitoring - RTC in 6 months.

## 2023-12-28 NOTE — ADDENDUM
[FreeTextEntry1] : Karina COPE assisted in documentation on 12/28/2023 acting as a scribe for Dr. Ely Abrams.

## 2023-12-28 NOTE — PROCEDURE
[No] : not [Atrial Fibrillation] : atrial fibrillation [Pacemaker] : pacemaker [DDDR] : DDDR [Voltage: ___ volts] : Voltage was [unfilled] volts [Magnet Rate: ___ Ppm] : magnet rate was [unfilled] Ppm [Longevity: ___ months] : The estimated remaining battery life is [unfilled] months [Threshold Testing Performed] : Threshold testing was performed [Lead Imp:  ___ohms] : lead impedance was [unfilled] ohms [Sensing Amplitude ___mv] : sensing amplitude was [unfilled] mv [___V @] : [unfilled] V [___ ms] : [unfilled] ms [None] : none [Apace-Vpace ___ %] : Apace-Vpace [unfilled]% [de-identified] : [employer] [de-identified] : [id number] [de-identified] : [id number] [de-identified] : 10/11/2022 [de-identified] : 60 [de-identified] : AT/AF [place]: 51% In Sinus  Pt on Remote Monitoring

## 2024-03-21 NOTE — ED ADULT NURSE NOTE - NS ED NURSE IV DC DT
----- Message from Annemarie Worthy DNP sent at 3/21/2024 12:00 PM CDT -----  Vaginosis screen is negative. Still awaiting your pap smear results.   11-Feb-2023 10:44

## 2024-03-27 ENCOUNTER — NON-APPOINTMENT (OUTPATIENT)
Age: 89
End: 2024-03-27

## 2024-03-28 ENCOUNTER — APPOINTMENT (OUTPATIENT)
Dept: CARDIOLOGY | Facility: CLINIC | Age: 89
End: 2024-03-28
Payer: MEDICARE

## 2024-03-28 PROCEDURE — 93294 REM INTERROG EVL PM/LDLS PM: CPT

## 2024-03-28 PROCEDURE — 93296 REM INTERROG EVL PM/IDS: CPT

## 2024-06-26 ENCOUNTER — NON-APPOINTMENT (OUTPATIENT)
Age: 89
End: 2024-06-26

## 2024-06-27 ENCOUNTER — APPOINTMENT (OUTPATIENT)
Dept: CARDIOLOGY | Facility: CLINIC | Age: 89
End: 2024-06-27
Payer: MEDICARE

## 2024-06-27 PROCEDURE — 93296 REM INTERROG EVL PM/IDS: CPT

## 2024-06-27 PROCEDURE — 93294 REM INTERROG EVL PM/LDLS PM: CPT

## 2024-07-18 ENCOUNTER — APPOINTMENT (OUTPATIENT)
Dept: ORTHOPEDIC SURGERY | Facility: CLINIC | Age: 89
End: 2024-07-18
Payer: MEDICARE

## 2024-07-18 ENCOUNTER — RESULT CHARGE (OUTPATIENT)
Age: 89
End: 2024-07-18

## 2024-07-18 DIAGNOSIS — M17.11 UNILATERAL PRIMARY OSTEOARTHRITIS, RIGHT KNEE: ICD-10-CM

## 2024-07-18 PROCEDURE — 99203 OFFICE O/P NEW LOW 30 MIN: CPT | Mod: 25

## 2024-07-18 PROCEDURE — 20610 DRAIN/INJ JOINT/BURSA W/O US: CPT | Mod: RT

## 2024-07-18 PROCEDURE — 73560 X-RAY EXAM OF KNEE 1 OR 2: CPT | Mod: RT

## 2024-08-26 ENCOUNTER — APPOINTMENT (OUTPATIENT)
Dept: CARDIOLOGY | Facility: CLINIC | Age: 89
End: 2024-08-26
Payer: MEDICARE

## 2024-08-26 VITALS
BODY MASS INDEX: 22.5 KG/M2 | HEIGHT: 63 IN | DIASTOLIC BLOOD PRESSURE: 62 MMHG | HEART RATE: 67 BPM | WEIGHT: 127 LBS | OXYGEN SATURATION: 98 % | SYSTOLIC BLOOD PRESSURE: 110 MMHG

## 2024-08-26 DIAGNOSIS — I10 ESSENTIAL (PRIMARY) HYPERTENSION: ICD-10-CM

## 2024-08-26 DIAGNOSIS — I48.0 PAROXYSMAL ATRIAL FIBRILLATION: ICD-10-CM

## 2024-08-26 DIAGNOSIS — E78.5 HYPERLIPIDEMIA, UNSPECIFIED: ICD-10-CM

## 2024-08-26 DIAGNOSIS — G45.9 TRANSIENT CEREBRAL ISCHEMIC ATTACK, UNSPECIFIED: ICD-10-CM

## 2024-08-26 DIAGNOSIS — I49.5 SICK SINUS SYNDROME: ICD-10-CM

## 2024-08-26 PROCEDURE — G2211 COMPLEX E/M VISIT ADD ON: CPT

## 2024-08-26 PROCEDURE — 99214 OFFICE O/P EST MOD 30 MIN: CPT

## 2024-08-26 RX ORDER — TORSEMIDE 5 MG/1
5 TABLET ORAL DAILY
Refills: 0 | Status: ACTIVE | COMMUNITY
Start: 2024-08-05

## 2024-08-26 NOTE — HISTORY OF PRESENT ILLNESS
[FreeTextEntry1] : Ms. Samaniego is an 87yo F with PMHx of HTN, HLD, AF, TIA (many years ago) who presents for follow up. Her PMD is Dr. Igor Bass and previously followed with Dr. Luis Espino. Patient was admitted to [facility] in January 2022 for syncope work up. She has been feeling lightheaded at times along with SOB. She feels like she needs to take a deep breath to catch her breath. Lightheadedness has been occurring a few times a day, a couple days a week. She denies syncope but has had near syncope.  -Patient found to have 6s pause on MCOT. Sent to [facility] for PPM placement. She underwent dual chamber [facility] PPM on 10/11/22. She had CP post procedure and was being treated for possible pericarditis. She completed colchicine. She had an episode of CP the other morning after burping. It lasted for a few minutes, then subsided. Denies further pain.  -She has been having some orthopnea/PND over the last few weeks. SOB will only last a few seconds before she catches her breathe. Denies other exertional symptoms, LE edema. She recently saw EP and was told her AF burden was high and increased metoprolol.  02/07/23-Patient is feeling much better. Denies any further orthopnea or PND. Repeat TTE essentially unchanged.  07/20/23-Patient was having hypotension and her cardiologist in [place] told her to have salty food. She gets some LE swelling which remits in the morning. She still gets occasional palpitations. Had Eliquis lowered.  10/04/23-She had episode of chest pain, sweating. She then had hiccups for about 4 days. It resolved without intervention. She is feeling better overall.  10/24/23-Patient with tenderness over PPM site. She denies fevers, chills, swelling of area. 12/12/23-She is feeling well. She had some depressed mood due to her son in law who has stage 4 lung cancer.  She feels like it causes her to be fatigued. She has some leg swelling at night which gets better in the morning.  08/26/24-She is feeling well. She had lost some muscle mass. She was just diagnosed with Alzheimer's. But she is not really having symptoms. She will still get some swelling.

## 2024-08-26 NOTE — PHYSICAL EXAM
[Well Developed] : well developed [Well Nourished] : well nourished [No Acute Distress] : no acute distress [Normal Conjunctiva] : normal conjunctiva [Normal Venous Pressure] : normal venous pressure [5th Left ICS - MCL] : palpated at the 5th LICS in the midclavicular line [Normal] : normal [No Precordial Heave] : no precordial heave was noted [Normal Rate] : normal [Irregularly Irregular] : irregularly irregular [Normal S1] : normal S1 [Normal S2] : normal S2 [No Murmur] : no murmurs heard [No Pitting Edema] : no pitting edema present [2+] : left 2+ [Clear Lung Fields] : clear lung fields [Good Air Entry] : good air entry [No Respiratory Distress] : no respiratory distress  [Soft] : abdomen soft [Non Tender] : non-tender [Normal Bowel Sounds] : normal bowel sounds [Normal Gait] : normal gait [No Edema] : no edema [No Varicosities] : no varicosities [No Rash] : no rash [Moves all extremities] : moves all extremities [No Focal Deficits] : no focal deficits [Alert and Oriented] : alert and oriented [de-identified] : Right upper chest wall incision, well healing.

## 2024-08-26 NOTE — HISTORY OF PRESENT ILLNESS
[FreeTextEntry1] : Ms. Samaniego is an 87yo F with PMHx of HTN, HLD, AF, TIA (many years ago) who presents for follow up. Her PMD is Dr. Igor Bass and previously followed with Dr. Luis Espino. Patient was admitted to [facility] in January 2022 for syncope work up. She has been feeling lightheaded at times along with SOB. She feels like she needs to take a deep breath to catch her breath. Lightheadedness has been occurring a few times a day, a couple days a week. She denies syncope but has had near syncope.  -Patient found to have 6s pause on MCOT. Sent to [facility] for PPM placement. She underwent dual chamber [facility] PPM on 10/11/22. She had CP post procedure and was being treated for possible pericarditis. She completed colchicine. She had an episode of CP the other morning after burping. It lasted for a few minutes, then subsided. Denies further pain.  -She has been having some orthopnea/PND over the last few weeks. SOB will only last a few seconds before she catches her breathe. Denies other exertional symptoms, LE edema. She recently saw EP and was told her AF burden was high and increased metoprolol.  02/07/23-Patient is feeling much better. Denies any further orthopnea or PND. Repeat TTE essentially unchanged.  07/20/23-Patient was having hypotension and her cardiologist in [place] told her to have salty food. She gets some LE swelling which remits in the morning. She still gets occasional palpitations. Had Eliquis lowered.  10/04/23-She had episode of chest pain, sweating. She then had hiccups for about 4 days. It resolved without intervention. She is feeling better overall.  10/24/23-Patient with tenderness over PPM site. She denies fevers, chills, swelling of area. 12/12/23-She is feeling well. She had some depressed mood due to her son in law who has stage 4 lung cancer.  She feels like it causes her to be fatigued. She has some leg swelling at night which gets better in the morning.  08/26/24-She is feeling well. She had lost some muscle mass. She was just diagnosed with Alzheimer's. But she is not really having symptoms. She will still get some swelling.

## 2024-08-26 NOTE — PHYSICAL EXAM
[Well Developed] : well developed [Well Nourished] : well nourished [No Acute Distress] : no acute distress [Normal Conjunctiva] : normal conjunctiva [Normal Venous Pressure] : normal venous pressure [5th Left ICS - MCL] : palpated at the 5th LICS in the midclavicular line [Normal] : normal [No Precordial Heave] : no precordial heave was noted [Normal Rate] : normal [Irregularly Irregular] : irregularly irregular [Normal S1] : normal S1 [Normal S2] : normal S2 [No Murmur] : no murmurs heard [No Pitting Edema] : no pitting edema present [2+] : left 2+ [Clear Lung Fields] : clear lung fields [Good Air Entry] : good air entry [No Respiratory Distress] : no respiratory distress  [Soft] : abdomen soft [Non Tender] : non-tender [Normal Bowel Sounds] : normal bowel sounds [No Edema] : no edema [Normal Gait] : normal gait [No Varicosities] : no varicosities [No Rash] : no rash [Moves all extremities] : moves all extremities [No Focal Deficits] : no focal deficits [Alert and Oriented] : alert and oriented [de-identified] : Right upper chest wall incision, well healing.

## 2024-08-26 NOTE — REVIEW OF SYSTEMS
[Negative] : Heme/Lymph [SOB] : no shortness of breath [Orthopnea] : no orthopnea [Dyspnea on exertion] : not dyspnea during exertion [PND] : no PND [Dizziness] : no dizziness

## 2024-08-26 NOTE — REASON FOR VISIT
[Other: ____] : [unfilled] [FreeTextEntry1] : Diagnostic Tests: --------------------- EKG:  10/24/23-NSR with 1st degree AVB.  10/04/23-AF with intermittent V-pacing.  07/20/23-Atrial paced rhythm. LAD. rSr' in V1-2.  01/09/23-AF with paced beat at 76 bpm. Low voltage, precordial leads.  10/20/22-AF with PVCs vs aberrant conduction. Low voltage, precordial leads.  10/06/22-AF. LAD.  11/23/16-NSR. Low voltage, precordial leads.  --------------------- Echo: 01/31/23-TTE: EF 64%. Aortic sclerosis. 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 Mild TR.  10/11/22-TTE: EF 56%. Mild MV thickening. Mild MR, TR. PASP 35 mmHg, borderline pHTN. Mild aortic root calcification.  01/05/22-TTE: EF 60-65%. Aortic sclerosis. IAS aneurysm. Mild-mod TR. PASP 43.8 mmHg.  --------------------- US: 01/04/22-Carotid: 20-39% B/L ICA stenosis.  11/30/16-Carotid: 20-39% B/L ICA stenosis.  --------------------- Holter:  10/06-11/04/22-MCOT 30 day: HR  bpm (63 bpm). 33% AF, longest 6:15 hours. 2% PACs. 6 second pause. S/P PPM.

## 2024-08-26 NOTE — ASSESSMENT
[FreeTextEntry1] : Lightheadedness: Pt with pAF, currently in AF. Pt with 6s pauses. Likely cause of symptoms. S/P PPM -Continue to follow with EP for PPM checks.  AF: Rate controlled. [id number] -Continue with eliquis 2.5mg PO BID and metoprolol succinate 100mg PO daily. -If continued palpitations, will increase as needed.  PND/Orthopnea: Pt is euvolemic on exam. May be due to tachycardia overnight? -Now resolved. -TTE normal with mildly elevated BNP.  HTN: BP at goal per ACC/AHA 2018 guidelines -Continue with metoprolol succinate 100mg PO daily.  HLD: , TG 94, HDL 45, LDL 90->, , HDL 41, LDL 91 (07/24) -Continue with lovastatin 20mg PO and fenofibrate 54mg PO daily. -LFTs within normal limits.  -Safe to continue statin and fibrate together.   Chest discomfort/Hiccups: Unclear cause. CRP mildly elevated but without ongoing CP. -Doubt pericarditis recurrence. -Pt will monitor for further symptoms. -PPM check next month.  Pain at PPM site: CDI without fluctuance.  -CXR without abnormality.  -Discussed with EP who will evaluate.   Follow up in 6 months

## 2024-08-26 NOTE — ASSESSMENT
[FreeTextEntry1] : Lightheadedness: Pt with pAF, currently in AF. Pt with 6s pauses. Likely cause of symptoms. S/P PPM -Continue to follow with EP for PPM checks.  AF: Rate controlled. [id number] -Continue with eliquis 2.5mg PO BID and metoprolol succinate 100mg PO daily. -If continued palpitations, will increase as needed.  PND/Orthopnea: Pt is euvolemic on exam. May be due to tachycardia overnight? -Now resolved. -TTE normal with mildly elevated BNP.  HTN: BP at goal per ACC/AHA 2018 guidelines -Continue with metoprolol succinate 100mg PO daily.  HLD: , TG 94, HDL 45, LDL 90->, , HDL 41, LDL 91 (07/24) -Continue with lovastatin 20mg PO and fenofibrate 54mg PO daily. -LFTs within normal limits.  -Safe to continue statin and fibrate together.   Chest discomfort/Hiccups: Unclear cause. CRP mildly elevated but without ongoing CP. -Doubt pericarditis recurrence. -Pt will monitor for further symptoms. -PPM check next month.  Pain at PPM site: CDI without fluctuance.  -CXR without abnormality.  -Discussed with EP who will evaluate.   Follow up in 6 months

## 2024-09-12 ENCOUNTER — APPOINTMENT (OUTPATIENT)
Dept: ELECTROPHYSIOLOGY | Facility: CLINIC | Age: 89
End: 2024-09-12

## 2024-10-04 ENCOUNTER — APPOINTMENT (OUTPATIENT)
Dept: ELECTROPHYSIOLOGY | Facility: CLINIC | Age: 89
End: 2024-10-04
Payer: MEDICARE

## 2024-10-04 VITALS
HEART RATE: 72 BPM | WEIGHT: 131 LBS | SYSTOLIC BLOOD PRESSURE: 110 MMHG | HEIGHT: 63 IN | DIASTOLIC BLOOD PRESSURE: 60 MMHG | BODY MASS INDEX: 23.21 KG/M2

## 2024-10-04 DIAGNOSIS — I49.5 SICK SINUS SYNDROME: ICD-10-CM

## 2024-10-04 DIAGNOSIS — I10 ESSENTIAL (PRIMARY) HYPERTENSION: ICD-10-CM

## 2024-10-04 DIAGNOSIS — Z45.018 ENCOUNTER FOR ADJUSTMENT AND MANAGEMENT OF OTHER PART OF CARDIAC PACEMAKER: ICD-10-CM

## 2024-10-04 DIAGNOSIS — I48.0 PAROXYSMAL ATRIAL FIBRILLATION: ICD-10-CM

## 2024-10-04 PROCEDURE — 99214 OFFICE O/P EST MOD 30 MIN: CPT

## 2024-10-04 PROCEDURE — 93280 PM DEVICE PROGR EVAL DUAL: CPT

## 2024-10-04 RX ORDER — LOVASTATIN 40 MG/1
40 TABLET ORAL
Refills: 0 | Status: ACTIVE | COMMUNITY

## 2024-10-04 NOTE — PHYSICAL EXAM
[Normal Appearance] : normal appearance [General Appearance - In No Acute Distress] : no acute distress [Heart Rate And Rhythm] : heart rate and rhythm were normal [Heart Sounds] : normal S1 and S2 [] : no respiratory distress [Respiration, Rhythm And Depth] : normal respiratory rhythm and effort [Exaggerated Use Of Accessory Muscles For Inspiration] : no accessory muscle use [Left Infraclavicular] : left infraclavicular area [Clean] : clean [Dry] : dry [Well-Healed] : well-healed [Nail Clubbing] : no clubbing of the fingernails [Cyanosis, Localized] : no localized cyanosis [Petechial Hemorrhages (___cm)] : no petechial hemorrhages

## 2024-10-09 NOTE — REVIEW OF SYSTEMS
[Feeling Fatigued] : feeling fatigued [Memory Lapses Or Loss] : memory lapses or loss [Fever] : no fever [Headache] : no headache [Weight Gain (___ Lbs)] : no recent weight gain [Chills] : no chills [Weight Loss (___ Lbs)] : no recent weight loss [Negative] : Constitutional

## 2024-10-09 NOTE — PROCEDURE
[No] : not [Atrial Fibrillation] : atrial fibrillation [Pacemaker] : pacemaker [DDDR] : DDDR [Voltage: ___ volts] : Voltage was [unfilled] volts [Magnet Rate: ___ Ppm] : magnet rate was [unfilled] Ppm [Longevity: ___ months] : The estimated remaining battery life is [unfilled] months [Threshold Testing Performed] : Threshold testing was performed [Lead Imp:  ___ohms] : lead impedance was [unfilled] ohms [Sensing Amplitude ___mv] : sensing amplitude was [unfilled] mv [___V @] : [unfilled] V [___ ms] : [unfilled] ms [None] : none [Apace-Vpace ___ %] : Apace-Vpace [unfilled]% [See Device Printout] : See device printout [Normal] : The battery status is normal. [Programmed for Longevity] : output reprogrammed for improved battery longevity [de-identified] : [employer] [de-identified] : [id number] [de-identified] : [id number] [de-identified] : 10/11/2022 [de-identified] : 61 [de-identified] : 8.2-8.5 years [de-identified] : Normal device function. AT/AF [place]: 70%. (some episodes with RVR). The patient is enrolled on remote monitoring and transmitting.

## 2024-10-09 NOTE — PROCEDURE
[No] : not [Atrial Fibrillation] : atrial fibrillation [Pacemaker] : pacemaker [DDDR] : DDDR [Voltage: ___ volts] : Voltage was [unfilled] volts [Magnet Rate: ___ Ppm] : magnet rate was [unfilled] Ppm [Longevity: ___ months] : The estimated remaining battery life is [unfilled] months [Threshold Testing Performed] : Threshold testing was performed [Lead Imp:  ___ohms] : lead impedance was [unfilled] ohms [Sensing Amplitude ___mv] : sensing amplitude was [unfilled] mv [___V @] : [unfilled] V [___ ms] : [unfilled] ms [None] : none [Apace-Vpace ___ %] : Apace-Vpace [unfilled]% [See Device Printout] : See device printout [Normal] : The battery status is normal. [Programmed for Longevity] : output reprogrammed for improved battery longevity [de-identified] : [employer] [de-identified] : [id number] [de-identified] : [id number] [de-identified] : 10/11/2022 [de-identified] : 61 [de-identified] : 8.2-8.5 years [de-identified] : Normal device function. AT/AF [place]: 70%. (some episodes with RVR). The patient is enrolled on remote monitoring and transmitting.

## 2024-10-09 NOTE — PROCEDURE
[No] : not [Atrial Fibrillation] : atrial fibrillation [Pacemaker] : pacemaker [DDDR] : DDDR [Voltage: ___ volts] : Voltage was [unfilled] volts [Magnet Rate: ___ Ppm] : magnet rate was [unfilled] Ppm [Longevity: ___ months] : The estimated remaining battery life is [unfilled] months [Threshold Testing Performed] : Threshold testing was performed [Lead Imp:  ___ohms] : lead impedance was [unfilled] ohms [Sensing Amplitude ___mv] : sensing amplitude was [unfilled] mv [___V @] : [unfilled] V [___ ms] : [unfilled] ms [None] : none [Apace-Vpace ___ %] : Apace-Vpace [unfilled]% [See Device Printout] : See device printout [Normal] : The battery status is normal. [Programmed for Longevity] : output reprogrammed for improved battery longevity [de-identified] : [employer] [de-identified] : [id number] [de-identified] : [id number] [de-identified] : 10/11/2022 [de-identified] : 61 [de-identified] : 8.2-8.5 years [de-identified] : Normal device function. AT/AF [place]: 70%. (some episodes with RVR). The patient is enrolled on remote monitoring and transmitting.

## 2024-10-09 NOTE — HISTORY OF PRESENT ILLNESS
[de-identified] : 88-year-old female with HTN, HLD, paroxysmal atrial fibrillation, Tachy-erica syndrome s/p DC-PPM (SJM, 22, Non-dep), TIA, Atrial Fibrillation on Eliquis, presents today for routine device interrogation.  + Syncope/Dizziness- associated with conversion pauses. No episodes since PPM.  The patient denies chest pain/discomfort, dyspnea, palpitations, dizziness, lightheadedness and syncope.   EKG (12/28/2023): AP @ 60, , QRS 84, QTc 396  EKG (008/2/23): AP 60 EKG (1/5/23): AF 80 EKG (10/20/22): AF 81 TTE (10/22): Nl EF, Mod LAE Cardio: Dr. Bryant

## 2024-10-09 NOTE — HISTORY OF PRESENT ILLNESS
[de-identified] : 88-year-old female with HTN, HLD, paroxysmal atrial fibrillation, Tachy-erica syndrome s/p DC-PPM (SJM, 22, Non-dep), TIA, Atrial Fibrillation on Eliquis, presents today for routine device interrogation.  + Syncope/Dizziness- associated with conversion pauses. No episodes since PPM.  The patient denies chest pain/discomfort, dyspnea, palpitations, dizziness, lightheadedness and syncope.   EKG (12/28/2023): AP @ 60, , QRS 84, QTc 396  EKG (008/2/23): AP 60 EKG (1/5/23): AF 80 EKG (10/20/22): AF 81 TTE (10/22): Nl EF, Mod LAE Cardio: Dr. Bryant

## 2024-10-09 NOTE — ASSESSMENT
[FreeTextEntry1] : - Tachy-erica syndrome s/p DC-PPM (SJM, 22, Non-dep) PPM interrogation shows normally functioning DC-PPM. Battery life ok.  AT/AF burden 70%, some episodes with RVR. The patient is asymptomatic. Continue Metoprolol succinate 100 mg daily.  ## Paroxysmal atrial fibrillation- - On Eliquis. Compliant. No bleeding issues. Patient to contact us if there is any bleeding issues, interruption or any issues with OAC. Patient to go to ER/call 911 if any major bleeding. Age > 81, weight ~ 60 kg.  Her dose was lowered to 2.5 mg by her cardiologist in [place]. Dr. Abrams had discussed pros-cons with the patient and was decided to continue at 2.5 mg dosing. Labs from 07/05/2024 reviewed: H/H 12.8/40.2 and Cr 1.0.   -HTN-well controlled. Continue Metoprolol succinate 100 mg daily  -Follow-up in 9 months or prn.   I have also advised the patient to go to the nearest emergency room if she experiences any chest pain, dyspnea, syncope, or has any other compelling symptoms.

## 2024-10-09 NOTE — HISTORY OF PRESENT ILLNESS
[de-identified] : 88-year-old female with HTN, HLD, paroxysmal atrial fibrillation, Tachy-erica syndrome s/p DC-PPM (SJM, 22, Non-dep), TIA, Atrial Fibrillation on Eliquis, presents today for routine device interrogation.  + Syncope/Dizziness- associated with conversion pauses. No episodes since PPM.  The patient denies chest pain/discomfort, dyspnea, palpitations, dizziness, lightheadedness and syncope.   EKG (12/28/2023): AP @ 60, , QRS 84, QTc 396  EKG (008/2/23): AP 60 EKG (1/5/23): AF 80 EKG (10/20/22): AF 81 TTE (10/22): Nl EF, Mod LAE Cardio: Dr. Bryant

## 2024-10-09 NOTE — HISTORY OF PRESENT ILLNESS
[de-identified] : 88-year-old female with HTN, HLD, paroxysmal atrial fibrillation, Tachy-erica syndrome s/p DC-PPM (SJM, 22, Non-dep), TIA, Atrial Fibrillation on Eliquis, presents today for routine device interrogation.  + Syncope/Dizziness- associated with conversion pauses. No episodes since PPM.  The patient denies chest pain/discomfort, dyspnea, palpitations, dizziness, lightheadedness and syncope.   EKG (12/28/2023): AP @ 60, , QRS 84, QTc 396  EKG (008/2/23): AP 60 EKG (1/5/23): AF 80 EKG (10/20/22): AF 81 TTE (10/22): Nl EF, Mod LAE Cardio: Dr. Bryant

## 2024-10-09 NOTE — PROCEDURE
[No] : not [Atrial Fibrillation] : atrial fibrillation [Pacemaker] : pacemaker [DDDR] : DDDR [Voltage: ___ volts] : Voltage was [unfilled] volts [Magnet Rate: ___ Ppm] : magnet rate was [unfilled] Ppm [Longevity: ___ months] : The estimated remaining battery life is [unfilled] months [Threshold Testing Performed] : Threshold testing was performed [Lead Imp:  ___ohms] : lead impedance was [unfilled] ohms [Sensing Amplitude ___mv] : sensing amplitude was [unfilled] mv [___V @] : [unfilled] V [___ ms] : [unfilled] ms [None] : none [Apace-Vpace ___ %] : Apace-Vpace [unfilled]% [See Device Printout] : See device printout [Normal] : The battery status is normal. [Programmed for Longevity] : output reprogrammed for improved battery longevity [de-identified] : [employer] [de-identified] : [id number] [de-identified] : [id number] [de-identified] : 10/11/2022 [de-identified] : 61 [de-identified] : 8.2-8.5 years [de-identified] : Normal device function. AT/AF [place]: 70%. (some episodes with RVR). The patient is enrolled on remote monitoring and transmitting.

## 2024-10-14 ENCOUNTER — APPOINTMENT (OUTPATIENT)
Dept: NEUROLOGY | Facility: CLINIC | Age: 89
End: 2024-10-14

## 2024-10-28 ENCOUNTER — APPOINTMENT (OUTPATIENT)
Dept: NEUROLOGY | Facility: CLINIC | Age: 89
End: 2024-10-28

## 2025-01-07 ENCOUNTER — APPOINTMENT (OUTPATIENT)
Dept: ORTHOPEDIC SURGERY | Facility: CLINIC | Age: 89
End: 2025-01-07

## 2025-01-07 ENCOUNTER — NON-APPOINTMENT (OUTPATIENT)
Age: 89
End: 2025-01-07

## 2025-01-07 ENCOUNTER — APPOINTMENT (OUTPATIENT)
Dept: CARDIOLOGY | Facility: CLINIC | Age: 89
End: 2025-01-07

## 2025-01-07 ENCOUNTER — APPOINTMENT (OUTPATIENT)
Dept: CARDIOLOGY | Facility: CLINIC | Age: 89
End: 2025-01-07
Payer: MEDICARE

## 2025-01-07 PROCEDURE — 93294 REM INTERROG EVL PM/LDLS PM: CPT

## 2025-01-07 PROCEDURE — 93296 REM INTERROG EVL PM/IDS: CPT

## 2025-04-08 ENCOUNTER — APPOINTMENT (OUTPATIENT)
Dept: CARDIOLOGY | Facility: CLINIC | Age: 89
End: 2025-04-08

## 2025-04-08 PROCEDURE — 93294 REM INTERROG EVL PM/LDLS PM: CPT

## 2025-04-08 PROCEDURE — 93296 REM INTERROG EVL PM/IDS: CPT

## 2025-07-17 ENCOUNTER — APPOINTMENT (OUTPATIENT)
Dept: ELECTROPHYSIOLOGY | Facility: CLINIC | Age: 89
End: 2025-07-17